# Patient Record
Sex: MALE | Race: BLACK OR AFRICAN AMERICAN | NOT HISPANIC OR LATINO | Employment: UNEMPLOYED | URBAN - METROPOLITAN AREA
[De-identification: names, ages, dates, MRNs, and addresses within clinical notes are randomized per-mention and may not be internally consistent; named-entity substitution may affect disease eponyms.]

---

## 2019-07-09 NOTE — TELEPHONE ENCOUNTER
Pharmacy needs changed to [facility] [address], [phone]. Patient would like to have this prescription called in or sent electronically if possible rather than picking up a paper copy, please advise.

## 2019-07-09 NOTE — TELEPHONE ENCOUNTER
Pt is requesting a refill on his norco 325/75 3x daily. He states he gets 90 tablets for 30 days.  He is wanting it sent to the pharmacy or called in.

## 2019-07-10 ENCOUNTER — TELEPHONE (OUTPATIENT)
Dept: FAMILY MEDICINE CLINIC | Facility: CLINIC | Age: 54
End: 2019-07-10

## 2019-07-11 ENCOUNTER — TELEPHONE (OUTPATIENT)
Dept: FAMILY MEDICINE CLINIC | Facility: CLINIC | Age: 54
End: 2019-07-11

## 2019-07-11 RX ORDER — HYDROCODONE BITARTRATE AND ACETAMINOPHEN 7.5; 325 MG/1; MG/1
1 TABLET ORAL EVERY 6 HOURS PRN
Qty: 90 TABLET | Refills: 0 | Status: SHIPPED | OUTPATIENT
Start: 2019-07-11 | End: 2019-07-14

## 2019-07-11 NOTE — TELEPHONE ENCOUNTER
Please see telephone encounter from 7/09/2019. Patient has called several times asking about his medication refill.

## 2019-07-11 NOTE — TELEPHONE ENCOUNTER
Patient has called several times and is he very upset. Please call patient and update him on the status of his prescription. See other telephone encounters.

## 2019-07-25 RX ORDER — DICLOFENAC POTASSIUM 50 MG/1
50 TABLET, FILM COATED ORAL 3 TIMES DAILY
Qty: 90 TABLET | Refills: 0 | Status: SHIPPED | OUTPATIENT
Start: 2019-07-25 | End: 2020-01-20

## 2019-08-09 RX ORDER — HYDROCODONE BITARTRATE AND ACETAMINOPHEN 7.5; 325 MG/1; MG/1
1 TABLET ORAL EVERY 6 HOURS PRN
Qty: 90 TABLET | Refills: 0 | Status: SHIPPED | OUTPATIENT
Start: 2019-08-09 | End: 2019-08-12

## 2019-08-09 RX ORDER — HYDROCODONE BITARTRATE AND ACETAMINOPHEN 7.5; 325 MG/1; MG/1
1 TABLET ORAL EVERY 6 HOURS PRN
Qty: 90 TABLET | Refills: 0 | Status: CANCELLED | OUTPATIENT
Start: 2019-08-09

## 2019-08-09 RX ORDER — HYDROCODONE BITARTRATE AND ACETAMINOPHEN 7.5; 325 MG/1; MG/1
1 TABLET ORAL EVERY 6 HOURS PRN
Qty: 90 TABLET | Refills: 0 | Status: SHIPPED | OUTPATIENT
Start: 2019-08-09 | End: 2019-09-10 | Stop reason: SDUPTHER

## 2019-08-09 NOTE — TELEPHONE ENCOUNTER
Pt is requesting a refill on the following medication, Narco 325/7.5mg.  [facility]   Next appt 8/12

## 2019-08-12 ENCOUNTER — OFFICE VISIT (OUTPATIENT)
Dept: FAMILY MEDICINE CLINIC | Facility: CLINIC | Age: 54
End: 2019-08-12

## 2019-08-12 VITALS
TEMPERATURE: 97.9 F | DIASTOLIC BLOOD PRESSURE: 80 MMHG | SYSTOLIC BLOOD PRESSURE: 120 MMHG | BODY MASS INDEX: 35.51 KG/M2 | OXYGEN SATURATION: 96 % | HEART RATE: 54 BPM | WEIGHT: 226.25 LBS | HEIGHT: 67 IN

## 2019-08-12 DIAGNOSIS — I25.118 CORONARY ARTERY DISEASE OF NATIVE HEART WITH STABLE ANGINA PECTORIS, UNSPECIFIED VESSEL OR LESION TYPE (HCC): ICD-10-CM

## 2019-08-12 DIAGNOSIS — E78.5 HYPERLIPIDEMIA, UNSPECIFIED HYPERLIPIDEMIA TYPE: ICD-10-CM

## 2019-08-12 DIAGNOSIS — R52 ENCOUNTER FOR PAIN MANAGEMENT: ICD-10-CM

## 2019-08-12 DIAGNOSIS — M25.552 HIP PAIN, BILATERAL: ICD-10-CM

## 2019-08-12 DIAGNOSIS — Z79.899 HIGH RISK MEDICATIONS (NOT ANTICOAGULANTS) LONG-TERM USE: ICD-10-CM

## 2019-08-12 DIAGNOSIS — M25.551 HIP PAIN, BILATERAL: ICD-10-CM

## 2019-08-12 DIAGNOSIS — I10 ESSENTIAL HYPERTENSION: Primary | ICD-10-CM

## 2019-08-12 PROCEDURE — 99214 OFFICE O/P EST MOD 30 MIN: CPT | Performed by: FAMILY MEDICINE

## 2019-08-12 RX ORDER — POTASSIUM CHLORIDE 20 MEQ/1
20 TABLET, EXTENDED RELEASE ORAL DAILY
Qty: 30 TABLET | Refills: 6 | Status: SHIPPED | OUTPATIENT
Start: 2019-08-12 | End: 2020-06-15 | Stop reason: SDUPTHER

## 2019-08-12 NOTE — PROGRESS NOTES
Subjective   Navneet Roach is a 53 y.o. male.     Chief Complaint   Patient presents with   • Med Management   • Labs Only   • Hypertension   • Hyperlipidemia   • Hip Pain       Hypertension   This is a chronic problem. The current episode started more than 1 year ago. The problem is unchanged. The problem is controlled. Pertinent negatives include no blurred vision, chest pain, palpitations or shortness of breath. Past treatments include angiotensin blockers and diuretics. Current antihypertension treatment includes angiotensin blockers and diuretics. There is no history of angina, kidney disease or CAD/MI. There is no history of chronic renal disease, coarctation of the aorta, hyperaldosteronism, hypercortisolism or hyperparathyroidism.   Hyperlipidemia   This is a chronic problem. The current episode started more than 1 year ago. The problem is controlled. He has no history of chronic renal disease. Pertinent negatives include no chest pain or shortness of breath.   Hip Pain    Incident onset: chronic , more than 1 year ago, sp B hip replacement. The pain is present in the left hip and right thigh. The pain has been constant since onset. Pertinent negatives include no numbness. He has tried NSAIDs (s/p surgery, now on Norco) for the symptoms.          The following portions of the patient's history were reviewed and updated as appropriate: allergies, current medications, past family history, past medical history, past social history, past surgical history and problem list.    Past Medical History:   Diagnosis Date   • Abnormal ECG    • Acute bronchitis    • Arthritis    • Blood in stool    • Constipation    • Coronary artery disease    • Cough    • DDD (degenerative disc disease), cervical    • Dyspnea    • Edema    • Fatigue    • Hemorrhoids    • Hip pain    • History of bronchitis     INHALER PRN   • History of echocardiogram    • History of kidney stones 06/2016   • History of long-term treatment with  high-risk medication    • Hyperlipidemia    • Hypertension    • Insomnia    • Leg swelling     TAKES DIURETICS PRN   • Limb pain    • Limited joint range of motion (ROM)     NECK   • Low back pain    • Morbid obesity (CMS/HCC)    • Muscle spasm    • Neck pain    • Obesity    • Osteoarthritis    • Osteoarthritis of hips, bilateral    • Osteoarthritis of right hip    • Pain in right hip    • Peripheral neuropathy    • Rectal abscess    • Rectal pain    • Screening     STOP BANG   • Valvular heart disease        Past Surgical History:   Procedure Laterality Date   • CARDIAC CATHETERIZATION      DR KIM   • CERVICAL DISCECTOMY ANTERIOR      POSTERIOR   • TOTAL HIP ARTHROPLASTY Right 8/19/2016    Procedure: RT TOTAL HIP ARTHROPLASTY ANTERIOR WITH HANA TABLE;  Surgeon: Joe Gore MD;  Location: [facility] OR;  Service:    • TOTAL HIP ARTHROPLASTY Left 11/4/2016    Procedure: LT TOTAL HIP ARTHROPLASTY;  Surgeon: Joe Gore MD;  Location: [facility] OR;  Service:        Family History   Problem Relation Age of Onset   • Hypertension Mother    • Hypertension Father    • Hyperlipidemia Father    • Hypertension Sister    • Hypertension Brother        Social History     Socioeconomic History   • Marital status:      Spouse name: Not on file   • Number of children: Not on file   • Years of education: Not on file   • Highest education level: Not on file   Tobacco Use   • Smoking status: Current Every Day Smoker     Packs/day: 0.25     Years: 28.00     Pack years: 7.00     Types: Cigarettes   Substance and Sexual Activity   • Alcohol use: No     Comment: OCCASIONAL WEEKLY OR MONTHLY   • Drug use: Yes   • Sexual activity: Defer       Review of Systems   Constitutional: Negative for fatigue and unexpected weight gain.   Eyes: Negative for blurred vision.   Respiratory: Negative for cough, chest tightness and shortness of breath.    Cardiovascular: Negative for chest pain, palpitations and leg swelling.    Gastrointestinal: Negative for nausea and vomiting.   Endocrine: Negative for polydipsia and polyuria.   Genitourinary: Negative for frequency.   Musculoskeletal: Positive for arthralgias and back pain.   Skin: Negative for skin lesions.   Neurological: Negative for dizziness, light-headedness, numbness and headache.       Objective   Vitals:    08/12/19 1429   BP: 120/80   Pulse: 54   Temp: 97.9 °F (36.6 °C)   SpO2: 96%     Body mass index is 35.44 kg/m².  Physical Exam   Constitutional: He appears well-developed and well-nourished. No distress.   Cardiovascular: Normal rate and regular rhythm. Exam reveals no friction rub.   No murmur heard.  Pulmonary/Chest: Effort normal and breath sounds normal. No stridor. No respiratory distress.   Musculoskeletal:   B hip RROM 75% due to pain   Skin: He is not diaphoretic.   Nursing note and vitals reviewed.        Assessment/Plan   Navneet was seen today for med management, labs only, hypertension, hyperlipidemia and hip pain.    Diagnoses and all orders for this visit:    Essential hypertension  -     Comprehensive Metabolic Panel  -     Lipid Panel  -     CBC & Differential  -     potassium chloride (K-DUR,KLOR-CON) 20 MEQ CR tablet; Take 1 tablet by mouth Daily. Pt not taking bid as rx'ed. Takes one daily if needed for body cramps    Coronary artery disease of native heart with stable angina pectoris, unspecified vessel or lesion type (CMS/HCC)  -     Comprehensive Metabolic Panel  -     Lipid Panel    Hyperlipidemia, unspecified hyperlipidemia type  -     Comprehensive Metabolic Panel  -     Lipid Panel    Encounter for pain management  -     Compliance Drug Analysis, Ur - Urine, Clean Catch    Hip pain, bilateral  -     Compliance Drug Analysis, Ur - Urine, Clean Catch    High risk medications (not anticoagulants) long-term use  -     Compliance Drug Analysis, Ur - Urine, Clean Catch      Demetrio reviwed and complaint with therapy, however pt was counseled that he  will need to taken off chronic Norco use, consult with ortho strongly recommended.

## 2019-09-10 RX ORDER — HYDROCODONE BITARTRATE AND ACETAMINOPHEN 7.5; 325 MG/1; MG/1
1 TABLET ORAL EVERY 6 HOURS PRN
Qty: 90 TABLET | Refills: 0 | Status: SHIPPED | OUTPATIENT
Start: 2019-09-10 | End: 2019-10-07 | Stop reason: SDUPTHER

## 2019-10-07 ENCOUNTER — TELEPHONE (OUTPATIENT)
Dept: FAMILY MEDICINE CLINIC | Facility: CLINIC | Age: 54
End: 2019-10-07

## 2019-10-07 DIAGNOSIS — M16.11 OSTEOARTHRITIS OF RIGHT HIP, UNSPECIFIED OSTEOARTHRITIS TYPE: Primary | ICD-10-CM

## 2019-10-07 RX ORDER — HYDROCODONE BITARTRATE AND ACETAMINOPHEN 7.5; 325 MG/1; MG/1
1 TABLET ORAL EVERY 6 HOURS PRN
Qty: 90 TABLET | Refills: 0 | Status: SHIPPED | OUTPATIENT
Start: 2019-10-07 | End: 2019-11-13 | Stop reason: SDUPTHER

## 2019-10-11 ENCOUNTER — TELEPHONE (OUTPATIENT)
Dept: FAMILY MEDICINE CLINIC | Facility: CLINIC | Age: 54
End: 2019-10-11

## 2019-10-21 ENCOUNTER — OFFICE VISIT (OUTPATIENT)
Dept: FAMILY MEDICINE CLINIC | Facility: CLINIC | Age: 54
End: 2019-10-21

## 2019-10-21 VITALS
OXYGEN SATURATION: 95 % | BODY MASS INDEX: 34.88 KG/M2 | DIASTOLIC BLOOD PRESSURE: 88 MMHG | HEIGHT: 67 IN | SYSTOLIC BLOOD PRESSURE: 134 MMHG | WEIGHT: 222.25 LBS | TEMPERATURE: 98.7 F | HEART RATE: 65 BPM

## 2019-10-21 DIAGNOSIS — Z98.890 STATUS POST LUMBAR SPINE OPERATIVE PROCEDURE FOR DECOMPRESSION OF SPINAL CORD: ICD-10-CM

## 2019-10-21 DIAGNOSIS — M54.2 CHRONIC NECK PAIN: ICD-10-CM

## 2019-10-21 DIAGNOSIS — J45.20 MILD INTERMITTENT ASTHMA WITHOUT COMPLICATION: ICD-10-CM

## 2019-10-21 DIAGNOSIS — I10 ESSENTIAL HYPERTENSION: ICD-10-CM

## 2019-10-21 DIAGNOSIS — Z00.00 MEDICARE ANNUAL WELLNESS VISIT, SUBSEQUENT: Primary | ICD-10-CM

## 2019-10-21 DIAGNOSIS — E66.01 MORBIDLY OBESE (HCC): ICD-10-CM

## 2019-10-21 DIAGNOSIS — G89.29 CHRONIC NECK PAIN: ICD-10-CM

## 2019-10-21 PROCEDURE — 96160 PT-FOCUSED HLTH RISK ASSMT: CPT | Performed by: FAMILY MEDICINE

## 2019-10-21 PROCEDURE — G0439 PPPS, SUBSEQ VISIT: HCPCS | Performed by: FAMILY MEDICINE

## 2019-10-21 RX ORDER — ALBUTEROL SULFATE 90 UG/1
2 AEROSOL, METERED RESPIRATORY (INHALATION)
Qty: 1 INHALER | Refills: 6 | Status: SHIPPED | OUTPATIENT
Start: 2019-10-21 | End: 2019-12-19 | Stop reason: SDUPTHER

## 2019-10-21 NOTE — PROGRESS NOTES
The ABCs of the Annual Wellness Visit  Subsequent Medicare Wellness Visit    Chief Complaint   Patient presents with   • Hypertension   • Medicare Wellness-subsequent       Subjective   History of Present Illness:  Navneet Roach is a 53 y.o. male who presents for a Subsequent Medicare Wellness Visit.    HEALTH RISK ASSESSMENT    Recent Hospitalizations:  No hospitalization(s) within the last year.    Current Medical Providers:  Patient Care Team:  Payal Bernardo MD as PCP - General (Family Medicine)  Amarilis Beasley MD as Consulting Physician (Cardiology)    Smoking Status:  Social History     Tobacco Use   Smoking Status Current Every Day Smoker   • Packs/day: 0.25   • Years: 28.00   • Pack years: 7.00   • Types: Cigarettes   Smokeless Tobacco Never Used       Alcohol Consumption:  Social History     Substance and Sexual Activity   Alcohol Use No    Comment: OCCASIONAL WEEKLY OR MONTHLY       Depression Screen:   PHQ-2/PHQ-9 Depression Screening 10/21/2019   Little interest or pleasure in doing things 0   Feeling down, depressed, or hopeless 0   Trouble falling or staying asleep, or sleeping too much 0   Feeling tired or having little energy 0   Poor appetite or overeating 0   Feeling bad about yourself - or that you are a failure or have let yourself or your family down 0   Trouble concentrating on things, such as reading the newspaper or watching television 0   Moving or speaking so slowly that other people could have noticed. Or the opposite - being so fidgety or restless that you have been moving around a lot more than usual 0   Thoughts that you would be better off dead, or of hurting yourself in some way 0   Total Score 0   If you checked off any problems, how difficult have these problems made it for you to do your work, take care of things at home, or get along with other people? Not difficult at all       Fall Risk Screen:  EMILIANO Fall Risk Assessment has not been completed.    Health  Habits and Functional and Cognitive Screening:  Functional & Cognitive Status 10/21/2019   Do you have difficulty preparing food and eating? No   Do you have difficulty bathing yourself, getting dressed or grooming yourself? No   Do you have difficulty using the toilet? No   Do you have difficulty moving around from place to place? No   Do you have trouble with steps or getting out of a bed or a chair? No   Current Diet Well Balanced Diet   Dental Exam Not up to date   Eye Exam Up to date   Exercise (times per week) 3 times per week   Current Exercise Activities Include Walking   Do you need help using the phone?  No   Are you deaf or do you have serious difficulty hearing?  No   Do you need help with transportation? No   Do you need help shopping? No   Do you need help preparing meals?  No   Do you need help with housework?  No   Do you need help with laundry? No   Do you need help taking your medications? No   Do you need help managing money? No   Do you ever drive or ride in a car without wearing a seat belt? No   Have you felt unusual stress, anger or loneliness in the last month? No   Who do you live with? Alone   If you need help, do you have trouble finding someone available to you? Yes   Have you been bothered in the last four weeks by sexual problems? No   Do you have difficulty concentrating, remembering or making decisions? No         Does the patient have evidence of cognitive impairment? No    Asprin use counseling:Taking ASA appropriately as indicated    Age-appropriate Screening Schedule:  Refer to the list below for future screening recommendations based on patient's age, sex and/or medical conditions. Orders for these recommended tests are listed in the plan section. The patient has been provided with a written plan.    Health Maintenance   Topic Date Due   • PNEUMOCOCCAL VACCINE (19-64 MEDIUM RISK) (1 of 1 - PPSV23) 12/29/1986   • TDAP/TD VACCINES (1 - Tdap) 12/29/1986   • ZOSTER VACCINE (1 of 2)  12/29/2017   • LIPID PANEL  07/09/2019   • COLONOSCOPY  07/09/2019   • INFLUENZA VACCINE  Discontinued          The following portions of the patient's history were reviewed and updated as appropriate: allergies, current medications, past family history, past medical history, past social history, past surgical history and problem list.    Outpatient Medications Prior to Visit   Medication Sig Dispense Refill   • Amlodipine-Valsartan-HCTZ (EXFORGE HCT) -25 MG tablet Take 1 tablet by mouth Daily. Confirm that home med is with HCT. Pt states his home med has 3 meds/mg.     • aspirin 325 MG tablet Take 325 mg by mouth Daily. Pt stopped preop, hasnt had since 9/28/16     • cyclobenzaprine (FLEXERIL) 10 MG tablet Take 1 tablet by mouth 3 (Three) Times a Day As Needed for muscle spasms. 56 tablet 0   • diclofenac (CATAFLAM) 50 MG tablet Take 1 tablet by mouth 3 (Three) Times a Day. 90 tablet 0   • HYDROcodone-acetaminophen (NORCO) 7.5-325 MG per tablet Take 1 tablet by mouth Every 6 (Six) Hours As Needed for Moderate Pain . 90 tablet 0   • nebivolol (BYSTOLIC) 10 MG tablet Take 10 mg by mouth Daily.     • potassium chloride (K-DUR,KLOR-CON) 20 MEQ CR tablet Take 1 tablet by mouth Daily. Pt not taking bid as rx'ed. Takes one daily if needed for body cramps 30 tablet 6   • rosuvastatin (CRESTOR) 10 MG tablet Take 10 mg by mouth daily.     • torsemide (DEMADEX) 20 MG tablet Take 20 mg by mouth 2 (Two) Times a Day As Needed. Prn lower leg swelling; confirm dosage; pt not taking regularly     • albuterol (PROVENTIL HFA) 108 (90 BASE) MCG/ACT inhaler Inhale 2 puffs Every 8 (Eight) Hours As Needed.       No facility-administered medications prior to visit.        Patient Active Problem List   Diagnosis   • Osteoarthritis of right hip   • Primary osteoarthritis of left hip   • Anemia, posthemorrhagic, acute   • DAVID (acute kidney injury) (CMS/HCC)   • CKD (chronic kidney disease)   • HTN (hypertension)   • CAD (coronary  "artery disease)   • Hyperlipidemia   • Medicare annual wellness visit, subsequent   • Hip pain, bilateral   • Chronic neck pain   • Status post lumbar spine operative procedure for decompression of spinal cord   • Morbidly obese (CMS/[facility])       Advanced Care Planning:  Patient does not have an advance directive - information provided to the patient today    Review of Systems   Constitutional: Negative.    Respiratory: Negative.    Cardiovascular: Negative.        Compared to one year ago, the patient feels his physical health is the same.  Compared to one year ago, the patient feels his mental health is the same.    Reviewed chart for potential of high risk medication in the elderly: yes  Reviewed chart for potential of harmful drug interactions in the elderly:yes    Objective         Vitals:    10/21/19 1340   BP: 134/88   Pulse: 65   Temp: 98.7 °F (37.1 °C)   SpO2: 95%   Weight: 101 kg (222 lb 4 oz)   Height: 170.2 cm (67.01\")       Body mass index is 34.8 kg/m².  Discussed the patient's BMI with him. The BMI is above average; BMI management plan is completed.    Physical Exam   Constitutional: He appears well-developed and well-nourished. No distress.   Cardiovascular: Normal rate and regular rhythm.   No murmur heard.  Pulmonary/Chest: Effort normal and breath sounds normal. No respiratory distress.   Skin: He is not diaphoretic.   Nursing note and vitals reviewed.            Assessment/Plan   Medicare Risks and Personalized Health Plan  CMS Preventative Services Quick Reference  Fall Risk    The above risks/problems have been discussed with the patient.  Pertinent information has been shared with the patient in the After Visit Summary.  Follow up plans and orders are seen below in the Assessment/Plan Section.    Diagnoses and all orders for this visit:    1. Medicare annual wellness visit, subsequent (Primary)    2. Essential hypertension    3. Chronic neck pain    4. Status post lumbar spine operative " procedure for decompression of spinal cord    5. Morbidly obese (CMS/HCC)    6. Mild intermittent asthma without complication  -     albuterol sulfate HFA (PROVENTIL HFA) 108 (90 Base) MCG/ACT inhaler; Inhale 2 puffs 4 (Four) Times a Day.  Dispense: 1 inhaler; Refill: 6      Follow Up:  Return in about 2 weeks (around 11/4/2019) for pain management.     An After Visit Summary and PPPS were given to the patient.

## 2019-10-22 LAB
ALBUMIN SERPL-MCNC: 4.5 G/DL (ref 3.5–5.5)
ALBUMIN/GLOB SERPL: 2.1 {RATIO} (ref 1.2–2.2)
ALP SERPL-CCNC: 63 IU/L (ref 39–117)
ALT SERPL-CCNC: 15 IU/L (ref 0–44)
AST SERPL-CCNC: 15 IU/L (ref 0–40)
BASOPHILS # BLD AUTO: 0 X10E3/UL (ref 0–0.2)
BASOPHILS NFR BLD AUTO: 0 %
BILIRUB SERPL-MCNC: 0.3 MG/DL (ref 0–1.2)
BUN SERPL-MCNC: 15 MG/DL (ref 6–24)
BUN/CREAT SERPL: 15 (ref 9–20)
CALCIUM SERPL-MCNC: 9.3 MG/DL (ref 8.7–10.2)
CHLORIDE SERPL-SCNC: 108 MMOL/L (ref 96–106)
CHOLEST SERPL-MCNC: 147 MG/DL (ref 100–199)
CO2 SERPL-SCNC: 19 MMOL/L (ref 20–29)
CREAT SERPL-MCNC: 1.01 MG/DL (ref 0.76–1.27)
EOSINOPHIL # BLD AUTO: 0.1 X10E3/UL (ref 0–0.4)
EOSINOPHIL NFR BLD AUTO: 3 %
ERYTHROCYTE [DISTWIDTH] IN BLOOD BY AUTOMATED COUNT: 14 % (ref 12.3–15.4)
GLOBULIN SER CALC-MCNC: 2.1 G/DL (ref 1.5–4.5)
GLUCOSE SERPL-MCNC: 95 MG/DL (ref 65–99)
HCT VFR BLD AUTO: 46.9 % (ref 37.5–51)
HDLC SERPL-MCNC: 38 MG/DL
HGB BLD-MCNC: 15.9 G/DL (ref 13–17.7)
IMM GRANULOCYTES # BLD AUTO: 0 X10E3/UL (ref 0–0.1)
IMM GRANULOCYTES NFR BLD AUTO: 0 %
LDLC SERPL CALC-MCNC: 98 MG/DL (ref 0–99)
LYMPHOCYTES # BLD AUTO: 2.2 X10E3/UL (ref 0.7–3.1)
LYMPHOCYTES NFR BLD AUTO: 44 %
MCH RBC QN AUTO: 31.6 PG (ref 26.6–33)
MCHC RBC AUTO-ENTMCNC: 33.9 G/DL (ref 31.5–35.7)
MCV RBC AUTO: 93 FL (ref 79–97)
MONOCYTES # BLD AUTO: 0.3 X10E3/UL (ref 0.1–0.9)
MONOCYTES NFR BLD AUTO: 6 %
NEUTROPHILS # BLD AUTO: 2.3 X10E3/UL (ref 1.4–7)
NEUTROPHILS NFR BLD AUTO: 47 %
PLATELET # BLD AUTO: 210 X10E3/UL (ref 150–450)
POTASSIUM SERPL-SCNC: 4 MMOL/L (ref 3.5–5.2)
PROT SERPL-MCNC: 6.6 G/DL (ref 6–8.5)
RBC # BLD AUTO: 5.03 X10E6/UL (ref 4.14–5.8)
SODIUM SERPL-SCNC: 144 MMOL/L (ref 134–144)
TRIGL SERPL-MCNC: 53 MG/DL (ref 0–149)
VLDLC SERPL CALC-MCNC: 11 MG/DL (ref 5–40)
WBC # BLD AUTO: 4.9 X10E3/UL (ref 3.4–10.8)

## 2019-10-25 LAB — DRUGS UR: NORMAL

## 2019-11-07 DIAGNOSIS — M16.11 OSTEOARTHRITIS OF RIGHT HIP, UNSPECIFIED OSTEOARTHRITIS TYPE: ICD-10-CM

## 2019-11-07 NOTE — TELEPHONE ENCOUNTER
Patient needs a refill on his hydrocodone 7.5-325 please.  Pharmacy is [facility] on file.  His phone number is [phone].

## 2019-11-13 RX ORDER — HYDROCODONE BITARTRATE AND ACETAMINOPHEN 7.5; 325 MG/1; MG/1
1 TABLET ORAL EVERY 6 HOURS PRN
Qty: 90 TABLET | Refills: 0 | Status: SHIPPED | OUTPATIENT
Start: 2019-11-13 | End: 2019-12-12 | Stop reason: SDUPTHER

## 2019-11-13 NOTE — TELEPHONE ENCOUNTER
Can you please sign off on this for Dr. Bernardo? I think she forgot to do it yesterday before she left and the patient is out of medication.

## 2019-11-13 NOTE — TELEPHONE ENCOUNTER
CAN YOU PLEASE HAVE ANOTHER DR. SEND THIS IN? PT HAS BEEN WAITING 6 DAYS FOR REFILL? SEVERAL MESSAGES HAVE BEEN TAKEN.

## 2019-12-11 DIAGNOSIS — M16.11 OSTEOARTHRITIS OF RIGHT HIP, UNSPECIFIED OSTEOARTHRITIS TYPE: ICD-10-CM

## 2019-12-12 RX ORDER — HYDROCODONE BITARTRATE AND ACETAMINOPHEN 7.5; 325 MG/1; MG/1
1 TABLET ORAL EVERY 6 HOURS PRN
Qty: 90 TABLET | Refills: 0 | Status: SHIPPED | OUTPATIENT
Start: 2019-12-12 | End: 2020-01-13 | Stop reason: SDUPTHER

## 2019-12-19 DIAGNOSIS — J45.20 MILD INTERMITTENT ASTHMA WITHOUT COMPLICATION: ICD-10-CM

## 2019-12-19 RX ORDER — ALBUTEROL SULFATE 90 UG/1
2 AEROSOL, METERED RESPIRATORY (INHALATION)
Qty: 1 INHALER | Refills: 6 | Status: SHIPPED | OUTPATIENT
Start: 2019-12-19 | End: 2021-04-09 | Stop reason: SDUPTHER

## 2020-01-13 DIAGNOSIS — M16.11 OSTEOARTHRITIS OF RIGHT HIP, UNSPECIFIED OSTEOARTHRITIS TYPE: ICD-10-CM

## 2020-01-13 RX ORDER — HYDROCODONE BITARTRATE AND ACETAMINOPHEN 7.5; 325 MG/1; MG/1
1 TABLET ORAL EVERY 6 HOURS PRN
Qty: 90 TABLET | Refills: 0 | Status: SHIPPED | OUTPATIENT
Start: 2020-01-13 | End: 2020-03-19 | Stop reason: SDUPTHER

## 2020-01-13 NOTE — TELEPHONE ENCOUNTER
Patient needs a refill on his hydrocodone 7.5-325 please sent to the [facility] on file.  His number is [phone].

## 2020-01-20 RX ORDER — AMLODIPINE, VALSARTAN AND HYDROCHLOROTHIAZIDE 10; 320; 25 MG/1; MG/1; MG/1
1 TABLET ORAL DAILY
Qty: 30 TABLET | Refills: 3 | Status: SHIPPED | OUTPATIENT
Start: 2020-01-20 | End: 2020-01-27 | Stop reason: DRUGHIGH

## 2020-01-20 RX ORDER — DICLOFENAC POTASSIUM 50 MG/1
TABLET, FILM COATED ORAL
Qty: 90 TABLET | Refills: 0 | Status: SHIPPED | OUTPATIENT
Start: 2020-01-20 | End: 2020-02-13 | Stop reason: SDUPTHER

## 2020-01-27 RX ORDER — AMLODIPINE, VALSARTAN AND HYDROCHLOROTHIAZIDE 10; 320; 25 MG/1; MG/1; MG/1
1 TABLET ORAL DAILY
Qty: 30 TABLET | Refills: 3 | Status: CANCELLED | OUTPATIENT
Start: 2020-01-27

## 2020-01-27 RX ORDER — METOPROLOL SUCCINATE 50 MG/1
50 TABLET, EXTENDED RELEASE ORAL DAILY
Qty: 90 TABLET | Refills: 1 | Status: SHIPPED | OUTPATIENT
Start: 2020-01-27 | End: 2021-04-09 | Stop reason: SDUPTHER

## 2020-01-27 RX ORDER — ROSUVASTATIN CALCIUM 10 MG/1
10 TABLET, COATED ORAL DAILY
Qty: 90 TABLET | Refills: 3 | Status: SHIPPED | OUTPATIENT
Start: 2020-01-27 | End: 2020-02-14 | Stop reason: SDUPTHER

## 2020-01-27 RX ORDER — HYDROCHLOROTHIAZIDE 25 MG/1
25 TABLET ORAL DAILY
Qty: 90 TABLET | Refills: 1 | Status: SHIPPED | OUTPATIENT
Start: 2020-01-27 | End: 2021-03-12 | Stop reason: SDUPTHER

## 2020-01-27 RX ORDER — VALSARTAN 320 MG/1
320 TABLET ORAL DAILY
Qty: 90 TABLET | Refills: 3 | Status: SHIPPED | OUTPATIENT
Start: 2020-01-27 | End: 2020-04-15 | Stop reason: SDUPTHER

## 2020-01-27 RX ORDER — AMLODIPINE BESYLATE 10 MG/1
10 TABLET ORAL DAILY
Qty: 90 TABLET | Refills: 1 | Status: SHIPPED | OUTPATIENT
Start: 2020-01-27 | End: 2021-03-12 | Stop reason: SDUPTHER

## 2020-02-06 ENCOUNTER — TELEPHONE (OUTPATIENT)
Dept: FAMILY MEDICINE CLINIC | Facility: CLINIC | Age: 55
End: 2020-02-06

## 2020-02-13 RX ORDER — DICLOFENAC POTASSIUM 50 MG/1
TABLET, FILM COATED ORAL
Qty: 90 TABLET | Refills: 1 | Status: SHIPPED | OUTPATIENT
Start: 2020-02-13 | End: 2021-01-08 | Stop reason: SDUPTHER

## 2020-02-14 RX ORDER — ROSUVASTATIN CALCIUM 10 MG/1
10 TABLET, COATED ORAL DAILY
Qty: 90 TABLET | Refills: 3 | Status: SHIPPED | OUTPATIENT
Start: 2020-02-14 | End: 2020-02-20 | Stop reason: DRUGHIGH

## 2020-02-20 RX ORDER — ROSUVASTATIN CALCIUM 40 MG/1
40 TABLET, COATED ORAL DAILY
Qty: 30 TABLET | Refills: 6 | Status: SHIPPED | OUTPATIENT
Start: 2020-02-20 | End: 2021-04-09 | Stop reason: SDUPTHER

## 2020-03-11 ENCOUNTER — TELEPHONE (OUTPATIENT)
Dept: FAMILY MEDICINE CLINIC | Facility: CLINIC | Age: 55
End: 2020-03-11

## 2020-03-11 NOTE — TELEPHONE ENCOUNTER
Patient called and state he has had to cancel appointments due to lack of transportation.  He says his insurance told him that if we call them and give authorization for transportation and meal delivery, he could get that and be able to come to th doctor.  His insurance is [employer].  His phone number is [phone].

## 2020-03-11 NOTE — TELEPHONE ENCOUNTER
His member Id number is [id number] and the provider number [facility] that's listed on the back of his card is [phone].

## 2020-03-18 NOTE — TELEPHONE ENCOUNTER
Checking on paperwork for transportation and meal delivery. Also wants to know if an exception can be made for his Hydrocodone? Says he is out and hasnt had it in 2 months

## 2020-03-19 ENCOUNTER — OFFICE VISIT (OUTPATIENT)
Dept: FAMILY MEDICINE CLINIC | Facility: CLINIC | Age: 55
End: 2020-03-19

## 2020-03-19 ENCOUNTER — TELEPHONE (OUTPATIENT)
Dept: FAMILY MEDICINE CLINIC | Facility: CLINIC | Age: 55
End: 2020-03-19

## 2020-03-19 ENCOUNTER — RESULTS ENCOUNTER (OUTPATIENT)
Dept: FAMILY MEDICINE CLINIC | Facility: CLINIC | Age: 55
End: 2020-03-19

## 2020-03-19 VITALS
DIASTOLIC BLOOD PRESSURE: 84 MMHG | OXYGEN SATURATION: 95 % | SYSTOLIC BLOOD PRESSURE: 132 MMHG | WEIGHT: 231.25 LBS | BODY MASS INDEX: 36.29 KG/M2 | TEMPERATURE: 98.1 F | HEIGHT: 67 IN | HEART RATE: 68 BPM

## 2020-03-19 DIAGNOSIS — Z00.00 HEALTHCARE MAINTENANCE: ICD-10-CM

## 2020-03-19 DIAGNOSIS — Z12.11 COLON CANCER SCREENING: ICD-10-CM

## 2020-03-19 DIAGNOSIS — M16.11 OSTEOARTHRITIS OF RIGHT HIP, UNSPECIFIED OSTEOARTHRITIS TYPE: ICD-10-CM

## 2020-03-19 DIAGNOSIS — I10 ESSENTIAL HYPERTENSION: Primary | ICD-10-CM

## 2020-03-19 DIAGNOSIS — E78.5 HYPERLIPIDEMIA, UNSPECIFIED HYPERLIPIDEMIA TYPE: ICD-10-CM

## 2020-03-19 PROCEDURE — 99214 OFFICE O/P EST MOD 30 MIN: CPT | Performed by: FAMILY MEDICINE

## 2020-03-19 RX ORDER — HYDROCODONE BITARTRATE AND ACETAMINOPHEN 7.5; 325 MG/1; MG/1
1 TABLET ORAL EVERY 6 HOURS PRN
Qty: 90 TABLET | Refills: 0 | Status: CANCELLED | OUTPATIENT
Start: 2020-03-19

## 2020-03-19 RX ORDER — HYDROCODONE BITARTRATE AND ACETAMINOPHEN 7.5; 325 MG/1; MG/1
1 TABLET ORAL EVERY 6 HOURS PRN
Qty: 90 TABLET | Refills: 0 | Status: SHIPPED | OUTPATIENT
Start: 2020-03-19 | End: 2020-04-16 | Stop reason: SDUPTHER

## 2020-03-19 NOTE — TELEPHONE ENCOUNTER
I have called and spoke with them and they were suppose to fax forms. I have not receive the forms. Pt is to contact them and give them our fax number and have them fax the forms to our office.

## 2020-03-19 NOTE — PROGRESS NOTES
Subjective   Navneet Roach is a 54 y.o. male.     Chief Complaint   Patient presents with   • Med Refill     hydrocdone   • Hypertension   • Hyperlipidemia       Hypertension   This is a chronic problem. The current episode started more than 1 year ago. The problem is unchanged. The problem is controlled.   Hyperlipidemia   This is a chronic problem. The current episode started more than 1 year ago. The problem is controlled. Recent lipid tests were reviewed and are normal.   Osteoarthritis   Presents for follow-up (pain is controlled by pain medications) visit. His past medical history is significant for osteoarthritis.          The following portions of the patient's history were reviewed and updated as appropriate: allergies, current medications, past family history, past medical history, past social history, past surgical history and problem list.    Past Medical History:   Diagnosis Date   • Abnormal ECG    • Acute bronchitis    • Arthritis    • Blood in stool    • Constipation    • Coronary artery disease    • Cough    • DDD (degenerative disc disease), cervical    • Dyspnea    • Edema    • Fatigue    • Hemorrhoids    • Hip pain    • History of bronchitis     INHALER PRN   • History of echocardiogram    • History of kidney stones 06/2016   • History of long-term treatment with high-risk medication    • Hyperlipidemia    • Hypertension    • Insomnia    • Leg swelling     TAKES DIURETICS PRN   • Limb pain    • Limited joint range of motion (ROM)     NECK   • Low back pain    • Morbid obesity (CMS/HCC)    • Muscle spasm    • Neck pain    • Obesity    • Osteoarthritis    • Osteoarthritis of hips, bilateral    • Osteoarthritis of right hip    • Pain in right hip    • Peripheral neuropathy    • Rectal abscess    • Rectal pain    • Screening     STOP BANG   • Valvular heart disease        Past Surgical History:   Procedure Laterality Date   • CARDIAC CATHETERIZATION      DR KIM   • CERVICAL DISCECTOMY  ANTERIOR      POSTERIOR   • TOTAL HIP ARTHROPLASTY Right 8/19/2016    Procedure: RT TOTAL HIP ARTHROPLASTY ANTERIOR WITH HANA TABLE;  Surgeon: Joe Gore MD;  Location: [facility] MAIN OR;  Service:    • TOTAL HIP ARTHROPLASTY Left 11/4/2016    Procedure: LT TOTAL HIP ARTHROPLASTY;  Surgeon: Joe Gore MD;  Location: [facility] MAIN OR;  Service:        Family History   Problem Relation Age of Onset   • Hypertension Mother    • Hypertension Father    • Hyperlipidemia Father    • Hypertension Sister    • Hypertension Brother        Social History     Socioeconomic History   • Marital status:      Spouse name: Not on file   • Number of children: Not on file   • Years of education: Not on file   • Highest education level: Not on file   Tobacco Use   • Smoking status: Current Every Day Smoker     Packs/day: 0.25     Years: 28.00     Pack years: 7.00     Types: Cigarettes   • Smokeless tobacco: Never Used   Substance and Sexual Activity   • Alcohol use: No     Comment: OCCASIONAL WEEKLY OR MONTHLY   • Drug use: Yes   • Sexual activity: Defer       Current Outpatient Medications on File Prior to Visit   Medication Sig Dispense Refill   • albuterol sulfate HFA (PROVENTIL HFA) 108 (90 Base) MCG/ACT inhaler Inhale 2 puffs 4 (Four) Times a Day. 1 inhaler 6   • amLODIPine (NORVASC) 10 MG tablet Take 1 tablet by mouth Daily. 90 tablet 1   • aspirin 325 MG tablet Take 325 mg by mouth Daily. Pt stopped preop, hasnt had since 9/28/16     • cyclobenzaprine (FLEXERIL) 10 MG tablet Take 1 tablet by mouth 3 (Three) Times a Day As Needed for muscle spasms. 56 tablet 0   • diclofenac (CATAFLAM) 50 MG tablet Take 1 tablet by mouth three times daily. 90 tablet 1   • hydroCHLOROthiazide (HYDRODIURIL) 25 MG tablet Take 1 tablet by mouth Daily. 90 tablet 1   • metoprolol succinate XL (TOPROL-XL) 50 MG 24 hr tablet Take 1 tablet by mouth Daily. 90 tablet 1   • nebivolol (BYSTOLIC) 10 MG tablet Take 10 mg by mouth Daily.     • potassium  chloride (K-DUR,KLOR-CON) 20 MEQ CR tablet Take 1 tablet by mouth Daily. Pt not taking bid as rx'ed. Takes one daily if needed for body cramps 30 tablet 6   • rosuvastatin (CRESTOR) 40 MG tablet Take 1 tablet by mouth Daily. 30 tablet 6   • torsemide (DEMADEX) 20 MG tablet Take 20 mg by mouth 2 (Two) Times a Day As Needed. Prn lower leg swelling; confirm dosage; pt not taking regularly     • valsartan (DIOVAN) 320 MG tablet Take 1 tablet by mouth Daily. 90 tablet 3   • [DISCONTINUED] HYDROcodone-acetaminophen (NORCO) 7.5-325 MG per tablet Take 1 tablet by mouth Every 6 (Six) Hours As Needed for Moderate Pain . 90 tablet 0   • diclofenac sodium (VOTAREN XR) 100 MG 24 hr tablet Take 100 mg by mouth Daily.       No current facility-administered medications on file prior to visit.        Review of Systems   Constitutional: Negative.    Respiratory: Negative.    Cardiovascular: Negative.    Musculoskeletal: Positive for arthralgias and back pain.       Recent Results (from the past 4704 hour(s))   Comprehensive Metabolic Panel    Collection Time: 10/21/19  1:29 PM   Result Value Ref Range    Glucose 95 65 - 99 mg/dL    BUN 15 6 - 24 mg/dL    Creatinine 1.01 0.76 - 1.27 mg/dL    eGFR Non African Am 86 >59 mL/min/1.73    eGFR African Am 99 >59 mL/min/1.73    BUN/Creatinine Ratio 15 9 - 20    Sodium 144 134 - 144 mmol/L    Potassium 4.0 3.5 - 5.2 mmol/L    Chloride 108 (H) 96 - 106 mmol/L    Total CO2 19 (L) 20 - 29 mmol/L    Calcium 9.3 8.7 - 10.2 mg/dL    Total Protein 6.6 6.0 - 8.5 g/dL    Albumin 4.5 3.5 - 5.5 g/dL    Globulin 2.1 1.5 - 4.5 g/dL    A/G Ratio 2.1 1.2 - 2.2    Total Bilirubin 0.3 0.0 - 1.2 mg/dL    Alkaline Phosphatase 63 39 - 117 IU/L    AST (SGOT) 15 0 - 40 IU/L    ALT (SGPT) 15 0 - 44 IU/L   Lipid Panel    Collection Time: 10/21/19  1:29 PM   Result Value Ref Range    Total Cholesterol 147 100 - 199 mg/dL    Triglycerides 53 0 - 149 mg/dL    HDL Cholesterol 38 (L) >39 mg/dL    VLDL Cholesterol 11 5 -  40 mg/dL    LDL Cholesterol  98 0 - 99 mg/dL   CBC & Differential    Collection Time: 10/21/19  1:29 PM   Result Value Ref Range    WBC 4.9 3.4 - 10.8 x10E3/uL    RBC 5.03 4.14 - 5.80 x10E6/uL    Hemoglobin 15.9 13.0 - 17.7 g/dL    Hematocrit 46.9 37.5 - 51.0 %    MCV 93 79 - 97 fL    MCH 31.6 26.6 - 33.0 pg    MCHC 33.9 31.5 - 35.7 g/dL    RDW 14.0 12.3 - 15.4 %    Platelets 210 150 - 450 x10E3/uL    Neutrophil Rel % 47 Not Estab. %    Lymphocyte Rel % 44 Not Estab. %    Monocyte Rel % 6 Not Estab. %    Eosinophil Rel % 3 Not Estab. %    Basophil Rel % 0 Not Estab. %    Neutrophils Absolute 2.3 1.4 - 7.0 x10E3/uL    Lymphocytes Absolute 2.2 0.7 - 3.1 x10E3/uL    Monocytes Absolute 0.3 0.1 - 0.9 x10E3/uL    Eosinophils Absolute 0.1 0.0 - 0.4 x10E3/uL    Basophils Absolute 0.0 0.0 - 0.2 x10E3/uL    Immature Granulocyte Rel % 0 Not Estab. %    Immature Grans Absolute 0.0 0.0 - 0.1 x10E3/uL   Compliance Drug Analysis, Ur - Urine, Clean Catch    Collection Time: 10/21/19  1:41 PM   Result Value Ref Range    Report Summary FINAL    CBC AND DIFFERENTIAL    Collection Time: 12/14/19  7:16 PM   Result Value Ref Range    WBC 5.02 4.5 - 11.0 10*3/uL    RBC 5.01 4.5 - 5.9 10*6/uL    Hemoglobin 15.7 13.5 - 17.5 g/dL    Hematocrit 46.6 41.0 - 53.0 %    MCV 93.0 80.0 - 100.0 fL    MCH 31.3 26.0 - 34.0 pg    MCHC 33.7 31.0 - 37.0 g/dL    RDW 12.8 12.0 - 16.8 %    Platelets 120 (L) 140 - 440 10*3/uL    MPV 11.6 (H) 6.7 - 10.8 fL    Differential [facility] CBC w/AutoDiff (arb'U)    Neutrophil Rel % 68.5 45 - 80 %    Lymphocyte Rel % 21.7 15 - 50 %    Monocyte Rel % 9.4 0 - 15 %    Eosinophil % 0.0 0 - 7 %    Basophil Rel % 0.2 0 - 2 %    Immature Grans % 0.2 (H) 0 %    nRBC 0 0 /100(WBC)    Neutrophils Absolute 3.44 2.0 - 8.8 10*3/uL    Lymphocytes Absolute 1.09 0.7 - 5.5 10*3/uL    Monocytes Absolute 0.47 0.0 - 1.7 10*3/uL    Eosinophils Absolute 0.00 0.0 - 0.8 10*3/uL    Basophils Absolute 0.01 0.0 - 0.2 10*3/uL    Immature  "Grans, Absolute 0.01 <1 10*3/uL   COMPREHENSIVE METABOLIC PANEL    Collection Time: 12/14/19  7:16 PM   Result Value Ref Range    Sodium 137 137 - 145 mmol/L    Potassium 3.5 3.5 - 5.1 mmol/L    Chloride 99 98 - 107 mmol/L    Total CO2 27 22 - 30 mmol/L    Glucose 102 (H) 74 - 99 mg/dL    BUN 11 7 - 20 mg/dL    Creatinine 1.3 0.7 - 1.5 mg/dL    BUN/Creatinine Ratio 8.5 RATIO    Est GFR by Clearance 58 (L) >60 /1.73 m2    Total Protein 7.2 6.3 - 8.2 g/dL    Albumin 3.9 3.5 - 5.0 g/dL    Globulin 3.3 1.5 - 4.5 g/dL    A/G Ratio 1.2 1.1 - 2.5 RATIO    Calcium 8.2 (L) 8.4 - 10.2 mg/dL    Total Bilirubin 0.4 0.2 - 1.3 mg/dL    AST (SGOT) 54 (H) 15 - 46 U/L    ALT (SGPT) 30 0 - 50 U/L    Alkaline Phosphatase 43 38 - 126 U/L   PROTIME-INR    Collection Time: 12/14/19  7:16 PM   Result Value Ref Range    Protime 13.1 10.3 - 13.3 s    INR 1.2 INR   APTT    Collection Time: 12/14/19  7:16 PM   Result Value Ref Range    PTT 20.7 (L) 25.3 - 35.0 s   LACTIC ACID, PLASMA    Collection Time: 12/14/19  7:16 PM   Result Value Ref Range    Lactate 1.3 0.7 - 2.0 mmol/L     Objective   Vitals:    03/19/20 1117   BP: 132/84   Pulse: 68   Temp: 98.1 °F (36.7 °C)   SpO2: 95%   Weight: 105 kg (231 lb 4 oz)   Height: 170.2 cm (67.01\")     Body mass index is 36.21 kg/m².  Physical Exam   Constitutional: He appears well-developed and well-nourished. No distress.   Cardiovascular: Normal rate and regular rhythm.   Pulmonary/Chest: Effort normal and breath sounds normal. No respiratory distress. He has no wheezes.   Skin: He is not diaphoretic.   Nursing note and vitals reviewed.        Assessment/Plan   Navneet was seen today for med refill, hypertension and hyperlipidemia.    Diagnoses and all orders for this visit:    Essential hypertension  -     Comprehensive Metabolic Panel  -     Lipid Panel    Healthcare maintenance  -     Cologuard - Stool, Per Rectum; Future    Colon cancer screening  -     Cologuard - Stool, Per Rectum; " Future    Osteoarthritis of right hip, unspecified osteoarthritis type  -     HYDROcodone-acetaminophen (NORCO) 7.5-325 MG per tablet; Take 1 tablet by mouth Every 6 (Six) Hours As Needed for Moderate Pain .    Hyperlipidemia, unspecified hyperlipidemia type  -     Comprehensive Metabolic Panel  -     Lipid Panel    Return in about 3 months (around 6/19/2020) for HYPERTENSION.      Patient has been counseled that the hydrocodone at 7.5 mg is not an appropriate chronic pain medication treatment.  Patient understands.  At this time Demetrio was reviewed and his compliance with treatment 3 urine drug screen was done in October.  Patient understands the treatment of her chronic pain should be done with long-acting opioids.  However at this time refuses to switch to long-acting cocci codon or morphine equivalent.  He is counseled about weaning his hydrocodone 7.5 mg and he is not taking it 3 times a day on a daily basis he is actually reducing his dose.  His last prescription lasted him 3 months.

## 2020-03-20 LAB
ALBUMIN SERPL-MCNC: 4.4 G/DL (ref 3.5–5.2)
ALBUMIN/GLOB SERPL: 1.8 G/DL
ALP SERPL-CCNC: 57 U/L (ref 39–117)
ALT SERPL-CCNC: 30 U/L (ref 1–41)
AST SERPL-CCNC: 16 U/L (ref 1–40)
BILIRUB SERPL-MCNC: 0.4 MG/DL (ref 0.2–1.2)
BUN SERPL-MCNC: 17 MG/DL (ref 6–20)
BUN/CREAT SERPL: 15.9 (ref 7–25)
CALCIUM SERPL-MCNC: 9.4 MG/DL (ref 8.6–10.5)
CHLORIDE SERPL-SCNC: 105 MMOL/L (ref 98–107)
CHOLEST SERPL-MCNC: 120 MG/DL (ref 0–200)
CO2 SERPL-SCNC: 27.8 MMOL/L (ref 22–29)
CREAT SERPL-MCNC: 1.07 MG/DL (ref 0.76–1.27)
GLOBULIN SER CALC-MCNC: 2.5 GM/DL
GLUCOSE SERPL-MCNC: 102 MG/DL (ref 65–99)
HDLC SERPL-MCNC: 37 MG/DL (ref 40–60)
LDLC SERPL CALC-MCNC: 69 MG/DL (ref 0–100)
POTASSIUM SERPL-SCNC: 4.1 MMOL/L (ref 3.5–5.2)
PROT SERPL-MCNC: 6.9 G/DL (ref 6–8.5)
SODIUM SERPL-SCNC: 145 MMOL/L (ref 136–145)
TRIGL SERPL-MCNC: 70 MG/DL (ref 0–150)
VLDLC SERPL CALC-MCNC: 14 MG/DL

## 2020-04-13 ENCOUNTER — TELEPHONE (OUTPATIENT)
Dept: FAMILY MEDICINE CLINIC | Facility: CLINIC | Age: 55
End: 2020-04-13

## 2020-04-13 NOTE — TELEPHONE ENCOUNTER
Called patient to inform him that we do not have any mask or gloves that he can get because we are running low ourselves

## 2020-04-13 NOTE — TELEPHONE ENCOUNTER
"PATIENT WAS CALLING TO CHECK THE STATUS ON HIS TRANSPORTATION AND MEAL DELIVERY SERVICES \"FORMS\" PATIENT STATED HE HAS SPOKEN WITH SOMEONE IN THE OFFICE IN THE LAST WEEK OR SO AND THEY TOLD HIM IT WOULD BE WORKED ON AND SOMEONE WOULD EITHER CALL HIM OR HE WOULD GET SOMETHING IN THE MAIL WITHIN A FEW DAYS. PATIENT STATED HE WAS INSTRUCTED BY THE OFFICE PERSON HE SPOKE WITH HIS INSURANCE COMPANY SHOULD BE \"FAXING OVER\" THE INFORMATION BUT AT THAT TIME THEY HAD NOT RECEIVED IT. PLEASE ADVISE AND CALL BACK PATIENT -[phone]       PATIENT ALSO ASKED IF THE OFFICE HAD ANY SPARE GLOVES OR MASKS FOR HIM TO HAVE.   "

## 2020-04-13 NOTE — TELEPHONE ENCOUNTER
Does this sound familiar? Is there any information you may be able to give me so I can work on getting this for patient. I read [employer]' messages about this but I do not have any faxes for this.

## 2020-04-13 NOTE — TELEPHONE ENCOUNTER
Spoke with patient about forms for transportation and meal delivery. Patient is going to call [facility] again to get them to fax over the forms once more due to us not receiving them yet.

## 2020-04-13 NOTE — TELEPHONE ENCOUNTER
PT WOULD LIKE TO KNOW IF HE CAN GET GLOVES AND MASKS? HE SAYS THAT HE IS HAVING A HARD TIME FINDING SOME, OR WHERE HE CAN GET SOME.

## 2020-04-13 NOTE — TELEPHONE ENCOUNTER
Sorry, I dont k ow about those. Jez might have done those and I just signed, but then copies should be in the chart. We may have to do those again, ask him to get them to us again if he needs it.

## 2020-04-15 ENCOUNTER — TELEPHONE (OUTPATIENT)
Dept: FAMILY MEDICINE CLINIC | Facility: CLINIC | Age: 55
End: 2020-04-15

## 2020-04-15 DIAGNOSIS — I10 ESSENTIAL HYPERTENSION: Primary | ICD-10-CM

## 2020-04-15 RX ORDER — VALSARTAN 320 MG/1
320 TABLET ORAL DAILY
Qty: 90 TABLET | Refills: 3 | Status: SHIPPED | OUTPATIENT
Start: 2020-04-15 | End: 2020-04-15

## 2020-04-15 RX ORDER — OLMESARTAN MEDOXOMIL 40 MG/1
40 TABLET ORAL DAILY
Qty: 90 TABLET | Refills: 3 | Status: SHIPPED | OUTPATIENT
Start: 2020-04-15 | End: 2021-04-09 | Stop reason: SDUPTHER

## 2020-04-16 DIAGNOSIS — M16.11 OSTEOARTHRITIS OF RIGHT HIP, UNSPECIFIED OSTEOARTHRITIS TYPE: ICD-10-CM

## 2020-04-16 RX ORDER — HYDROCODONE BITARTRATE AND ACETAMINOPHEN 7.5; 325 MG/1; MG/1
1 TABLET ORAL EVERY 6 HOURS PRN
Qty: 90 TABLET | Refills: 0 | Status: SHIPPED | OUTPATIENT
Start: 2020-04-16 | End: 2020-05-18 | Stop reason: SDUPTHER

## 2020-05-15 ENCOUNTER — TELEPHONE (OUTPATIENT)
Dept: FAMILY MEDICINE CLINIC | Facility: CLINIC | Age: 55
End: 2020-05-15

## 2020-05-15 DIAGNOSIS — M16.11 OSTEOARTHRITIS OF RIGHT HIP, UNSPECIFIED OSTEOARTHRITIS TYPE: ICD-10-CM

## 2020-05-15 RX ORDER — HYDROCODONE BITARTRATE AND ACETAMINOPHEN 7.5; 325 MG/1; MG/1
1 TABLET ORAL EVERY 6 HOURS PRN
Qty: 90 TABLET | Refills: 0 | Status: CANCELLED | OUTPATIENT
Start: 2020-05-15

## 2020-05-18 DIAGNOSIS — M16.11 OSTEOARTHRITIS OF RIGHT HIP, UNSPECIFIED OSTEOARTHRITIS TYPE: ICD-10-CM

## 2020-05-18 RX ORDER — HYDROCODONE BITARTRATE AND ACETAMINOPHEN 7.5; 325 MG/1; MG/1
1 TABLET ORAL EVERY 6 HOURS PRN
Qty: 90 TABLET | Refills: 0 | Status: CANCELLED | OUTPATIENT
Start: 2020-05-18

## 2020-05-18 RX ORDER — HYDROCODONE BITARTRATE AND ACETAMINOPHEN 7.5; 325 MG/1; MG/1
1 TABLET ORAL EVERY 6 HOURS PRN
Qty: 90 TABLET | Refills: 0 | Status: SHIPPED | OUTPATIENT
Start: 2020-05-18 | End: 2020-06-15 | Stop reason: SDUPTHER

## 2020-05-18 NOTE — TELEPHONE ENCOUNTER
Refilled Hydrocodone.  Last 2  Messages whoever sends them to us, did not specify which meds pt was asking to refill, I guess we have to guess?

## 2020-05-18 NOTE — TELEPHONE ENCOUNTER
PATIENT REQUESTED A REFILL ON:HYDROcodone-acetaminophen (NORCO) 7.5-325 MG per tablet    PATIENT CAN BE REACHED ON: [phone]    PHARMACY PREFERRED:[facility] #[id number] - [place] [id number] AT SEC OF [facility] - [phone]  - [phone] FX

## 2020-06-03 ENCOUNTER — TELEPHONE (OUTPATIENT)
Dept: FAMILY MEDICINE CLINIC | Facility: CLINIC | Age: 55
End: 2020-06-03

## 2020-06-03 NOTE — TELEPHONE ENCOUNTER
PATIENT CALLING IN TO ASK IF HE NEEDS TO COME INTO THE OFFICE FOR HIS 3MTH FU.  HE WOULD PREFER A MYCHART OR TELEHEALTH VISIT INSTEAD OFFICE.    DOES HE NEED LABS FOR HIS 3MTH CHECK UP.    PLEASE CALL AND ADVISE [phone]

## 2020-06-15 ENCOUNTER — TELEMEDICINE (OUTPATIENT)
Dept: FAMILY MEDICINE CLINIC | Facility: CLINIC | Age: 55
End: 2020-06-15

## 2020-06-15 DIAGNOSIS — M16.11 OSTEOARTHRITIS OF RIGHT HIP, UNSPECIFIED OSTEOARTHRITIS TYPE: ICD-10-CM

## 2020-06-15 DIAGNOSIS — E78.5 HYPERLIPIDEMIA, UNSPECIFIED HYPERLIPIDEMIA TYPE: Primary | ICD-10-CM

## 2020-06-15 DIAGNOSIS — I10 ESSENTIAL HYPERTENSION: ICD-10-CM

## 2020-06-15 PROCEDURE — 99214 OFFICE O/P EST MOD 30 MIN: CPT | Performed by: FAMILY MEDICINE

## 2020-06-15 RX ORDER — POTASSIUM CHLORIDE 20 MEQ/1
20 TABLET, EXTENDED RELEASE ORAL DAILY
Qty: 30 TABLET | Refills: 11 | Status: SHIPPED | OUTPATIENT
Start: 2020-06-15 | End: 2021-04-09 | Stop reason: SDUPTHER

## 2020-06-15 RX ORDER — HYDROCODONE BITARTRATE AND ACETAMINOPHEN 7.5; 325 MG/1; MG/1
1 TABLET ORAL EVERY 6 HOURS PRN
Qty: 90 TABLET | Refills: 0 | Status: SHIPPED | OUTPATIENT
Start: 2020-06-15 | End: 2020-07-16 | Stop reason: SDUPTHER

## 2020-06-15 NOTE — PROGRESS NOTES
Subjective   Navneet Roach is a 54 y.o. male.   Consent given    Visit via [employer]  Time 15 min    CC: follow up on meds.    Hypertension   This is a chronic problem. The current episode started more than 1 year ago. The problem is unchanged. The problem is controlled. Pertinent negatives include no blurred vision, chest pain, palpitations or shortness of breath.   Hyperlipidemia   This is a chronic problem. The current episode started more than 1 year ago. The problem is controlled. Recent lipid tests were reviewed and are normal. Pertinent negatives include no chest pain or shortness of breath.      Chronic low back pain/ osteoarthritis: stable on pain management.      The following portions of the patient's history were reviewed and updated as appropriate: allergies, current medications, past family history, past medical history, past social history, past surgical history and problem list.    Past Medical History:   Diagnosis Date   • Abnormal ECG    • Acute bronchitis    • Arthritis    • Blood in stool    • Constipation    • Coronary artery disease    • Cough    • DDD (degenerative disc disease), cervical    • Dyspnea    • Edema    • Fatigue    • Hemorrhoids    • Hip pain    • History of bronchitis     INHALER PRN   • History of echocardiogram    • History of kidney stones 06/2016   • History of long-term treatment with high-risk medication    • Hyperlipidemia    • Hypertension    • Insomnia    • Leg swelling     TAKES DIURETICS PRN   • Limb pain    • Limited joint range of motion (ROM)     NECK   • Low back pain    • Morbid obesity (CMS/HCC)    • Muscle spasm    • Neck pain    • Obesity    • Osteoarthritis    • Osteoarthritis of hips, bilateral    • Osteoarthritis of right hip    • Pain in right hip    • Peripheral neuropathy    • Rectal abscess    • Rectal pain    • Screening     STOP BANG   • Valvular heart disease        Past Surgical History:   Procedure Laterality Date   • CARDIAC CATHETERIZATION       DR KIM   • CERVICAL DISCECTOMY ANTERIOR      POSTERIOR   • TOTAL HIP ARTHROPLASTY Right 8/19/2016    Procedure: RT TOTAL HIP ARTHROPLASTY ANTERIOR WITH HANA TABLE;  Surgeon: Joe Gore MD;  Location: [facility] OR;  Service:    • TOTAL HIP ARTHROPLASTY Left 11/4/2016    Procedure: LT TOTAL HIP ARTHROPLASTY;  Surgeon: Joe Gore MD;  Location: [facility] MAIN OR;  Service:        Family History   Problem Relation Age of Onset   • Hypertension Mother    • Hypertension Father    • Hyperlipidemia Father    • Hypertension Sister    • Hypertension Brother        Social History     Socioeconomic History   • Marital status:      Spouse name: Not on file   • Number of children: Not on file   • Years of education: Not on file   • Highest education level: Not on file   Tobacco Use   • Smoking status: Current Every Day Smoker     Packs/day: 0.25     Years: 28.00     Pack years: 7.00     Types: Cigarettes   • Smokeless tobacco: Never Used   Substance and Sexual Activity   • Alcohol use: No     Comment: OCCASIONAL WEEKLY OR MONTHLY   • Drug use: Yes   • Sexual activity: Defer       Current Outpatient Medications on File Prior to Visit   Medication Sig Dispense Refill   • albuterol sulfate HFA (PROVENTIL HFA) 108 (90 Base) MCG/ACT inhaler Inhale 2 puffs 4 (Four) Times a Day. 1 inhaler 6   • amLODIPine (NORVASC) 10 MG tablet Take 1 tablet by mouth Daily. 90 tablet 1   • aspirin 325 MG tablet Take 325 mg by mouth Daily. Pt stopped preop, hasnt had since 9/28/16     • cyclobenzaprine (FLEXERIL) 10 MG tablet Take 1 tablet by mouth 3 (Three) Times a Day As Needed for muscle spasms. 56 tablet 0   • diclofenac (CATAFLAM) 50 MG tablet Take 1 tablet by mouth three times daily. 90 tablet 1   • diclofenac sodium (VOTAREN XR) 100 MG 24 hr tablet Take 100 mg by mouth Daily.     • hydroCHLOROthiazide (HYDRODIURIL) 25 MG tablet Take 1 tablet by mouth Daily. 90 tablet 1   • metoprolol succinate XL (TOPROL-XL) 50 MG 24 hr tablet  Take 1 tablet by mouth Daily. 90 tablet 1   • nebivolol (BYSTOLIC) 10 MG tablet Take 10 mg by mouth Daily.     • olmesartan (BENICAR) 40 MG tablet Take 1 tablet by mouth Daily. 90 tablet 3   • rosuvastatin (CRESTOR) 40 MG tablet Take 1 tablet by mouth Daily. 30 tablet 6   • torsemide (DEMADEX) 20 MG tablet Take 20 mg by mouth 2 (Two) Times a Day As Needed. Prn lower leg swelling; confirm dosage; pt not taking regularly     • [DISCONTINUED] HYDROcodone-acetaminophen (NORCO) 7.5-325 MG per tablet Take 1 tablet by mouth Every 6 (Six) Hours As Needed for Moderate Pain . 90 tablet 0   • [DISCONTINUED] potassium chloride (K-DUR,KLOR-CON) 20 MEQ CR tablet Take 1 tablet by mouth Daily. Pt not taking bid as rx'ed. Takes one daily if needed for body cramps 30 tablet 6     No current facility-administered medications on file prior to visit.        Review of Systems   Constitutional: Negative for fatigue.   Eyes: Negative for blurred vision.   Respiratory: Negative for cough, chest tightness and shortness of breath.    Cardiovascular: Negative for chest pain, palpitations and leg swelling.   Neurological: Negative for dizziness, light-headedness and headache.       Recent Results (from the past 4704 hour(s))   CBC AND DIFFERENTIAL    Collection Time: 12/14/19  7:16 PM   Result Value Ref Range    WBC 5.02 4.5 - 11.0 10*3/uL    RBC 5.01 4.5 - 5.9 10*6/uL    Hemoglobin 15.7 13.5 - 17.5 g/dL    Hematocrit 46.6 41.0 - 53.0 %    MCV 93.0 80.0 - 100.0 fL    MCH 31.3 26.0 - 34.0 pg    MCHC 33.7 31.0 - 37.0 g/dL    RDW 12.8 12.0 - 16.8 %    Platelets 120 (L) 140 - 440 10*3/uL    MPV 11.6 (H) 6.7 - 10.8 fL    Differential [facility] CBC w/AutoDiff (arb'U)    Neutrophil Rel % 68.5 45 - 80 %    Lymphocyte Rel % 21.7 15 - 50 %    Monocyte Rel % 9.4 0 - 15 %    Eosinophil % 0.0 0 - 7 %    Basophil Rel % 0.2 0 - 2 %    Immature Grans % 0.2 (H) 0 %    nRBC 0 0 /100(WBC)    Neutrophils Absolute 3.44 2.0 - 8.8 10*3/uL    Lymphocytes Absolute  1.09 0.7 - 5.5 10*3/uL    Monocytes Absolute 0.47 0.0 - 1.7 10*3/uL    Eosinophils Absolute 0.00 0.0 - 0.8 10*3/uL    Basophils Absolute 0.01 0.0 - 0.2 10*3/uL    Immature Grans, Absolute 0.01 <1 10*3/uL   COMPREHENSIVE METABOLIC PANEL    Collection Time: 12/14/19  7:16 PM   Result Value Ref Range    Sodium 137 137 - 145 mmol/L    Potassium 3.5 3.5 - 5.1 mmol/L    Chloride 99 98 - 107 mmol/L    Total CO2 27 22 - 30 mmol/L    Glucose 102 (H) 74 - 99 mg/dL    BUN 11 7 - 20 mg/dL    Creatinine 1.3 0.7 - 1.5 mg/dL    BUN/Creatinine Ratio 8.5 RATIO    Est GFR by Clearance 58 (L) >60 /1.73 m2    Total Protein 7.2 6.3 - 8.2 g/dL    Albumin 3.9 3.5 - 5.0 g/dL    Globulin 3.3 1.5 - 4.5 g/dL    A/G Ratio 1.2 1.1 - 2.5 RATIO    Calcium 8.2 (L) 8.4 - 10.2 mg/dL    Total Bilirubin 0.4 0.2 - 1.3 mg/dL    AST (SGOT) 54 (H) 15 - 46 U/L    ALT (SGPT) 30 0 - 50 U/L    Alkaline Phosphatase 43 38 - 126 U/L   PROTIME-INR    Collection Time: 12/14/19  7:16 PM   Result Value Ref Range    Protime 13.1 10.3 - 13.3 s    INR 1.2 INR   APTT    Collection Time: 12/14/19  7:16 PM   Result Value Ref Range    PTT 20.7 (L) 25.3 - 35.0 s   LACTIC ACID, PLASMA    Collection Time: 12/14/19  7:16 PM   Result Value Ref Range    Lactate 1.3 0.7 - 2.0 mmol/L   Comprehensive Metabolic Panel    Collection Time: 03/19/20 11:44 AM   Result Value Ref Range    Glucose 102 (H) 65 - 99 mg/dL    BUN 17 6 - 20 mg/dL    Creatinine 1.07 0.76 - 1.27 mg/dL    eGFR Non African Am 73 >60 mL/min/1.73    eGFR African Am 88 >60 mL/min/1.73    BUN/Creatinine Ratio 15.9 7.0 - 25.0    Sodium 145 136 - 145 mmol/L    Potassium 4.1 3.5 - 5.2 mmol/L    Chloride 105 98 - 107 mmol/L    Total CO2 27.8 22.0 - 29.0 mmol/L    Calcium 9.4 8.6 - 10.5 mg/dL    Total Protein 6.9 6.0 - 8.5 g/dL    Albumin 4.40 3.50 - 5.20 g/dL    Globulin 2.5 gm/dL    A/G Ratio 1.8 g/dL    Total Bilirubin 0.4 0.2 - 1.2 mg/dL    Alkaline Phosphatase 57 39 - 117 U/L    AST (SGOT) 16 1 - 40 U/L    ALT (SGPT) 30  1 - 41 U/L   Lipid Panel    Collection Time: 03/19/20 11:44 AM   Result Value Ref Range    Total Cholesterol 120 0 - 200 mg/dL    Triglycerides 70 0 - 150 mg/dL    HDL Cholesterol 37 (L) 40 - 60 mg/dL    VLDL Cholesterol 14 mg/dL    LDL Cholesterol  69 0 - 100 mg/dL     Objective   There were no vitals filed for this visit.  There is no height or weight on file to calculate BMI.  Physical Exam   Constitutional: He appears well-developed and well-nourished. No distress.   Skin: He is not diaphoretic.   Nursing note and vitals reviewed.        Assessment/Plan   Diagnoses and all orders for this visit:    Hyperlipidemia, unspecified hyperlipidemia type    Essential hypertension  -     potassium chloride (K-DUR,KLOR-CON) 20 MEQ CR tablet; Take 1 tablet by mouth Daily. Pt not taking bid as rx'ed. Takes one daily if needed for body cramps    Osteoarthritis of right hip, unspecified osteoarthritis type  -     HYDROcodone-acetaminophen (NORCO) 7.5-325 MG per tablet; Take 1 tablet by mouth Every 6 (Six) Hours As Needed for Moderate Pain .      Return in about 3 months (around 9/15/2020).

## 2020-07-15 DIAGNOSIS — M16.11 OSTEOARTHRITIS OF RIGHT HIP, UNSPECIFIED OSTEOARTHRITIS TYPE: ICD-10-CM

## 2020-07-15 NOTE — TELEPHONE ENCOUNTER
Pt called in for refill on medication     HYDROcodone-acetaminophen (NORCO) 7.5-325 MG per tablet    [id number] [place], [place] - [address] AT [facility] - [phone] PH - [phone] (Phone)  [phone] (Fax)

## 2020-07-16 RX ORDER — HYDROCODONE BITARTRATE AND ACETAMINOPHEN 7.5; 325 MG/1; MG/1
1 TABLET ORAL EVERY 6 HOURS PRN
Qty: 90 TABLET | Refills: 0 | Status: SHIPPED | OUTPATIENT
Start: 2020-07-16 | End: 2020-08-13 | Stop reason: SDUPTHER

## 2020-08-13 DIAGNOSIS — M16.11 OSTEOARTHRITIS OF RIGHT HIP, UNSPECIFIED OSTEOARTHRITIS TYPE: ICD-10-CM

## 2020-08-13 RX ORDER — HYDROCODONE BITARTRATE AND ACETAMINOPHEN 7.5; 325 MG/1; MG/1
1 TABLET ORAL EVERY 6 HOURS PRN
Qty: 90 TABLET | Refills: 0 | Status: SHIPPED | OUTPATIENT
Start: 2020-08-13 | End: 2020-09-14 | Stop reason: SDUPTHER

## 2020-08-31 ENCOUNTER — RESULTS ENCOUNTER (OUTPATIENT)
Dept: FAMILY MEDICINE CLINIC | Facility: CLINIC | Age: 55
End: 2020-08-31

## 2020-08-31 DIAGNOSIS — Z12.11 COLON CANCER SCREENING: Primary | ICD-10-CM

## 2020-08-31 DIAGNOSIS — Z12.11 COLON CANCER SCREENING: ICD-10-CM

## 2020-09-01 ENCOUNTER — OFFICE VISIT (OUTPATIENT)
Dept: FAMILY MEDICINE CLINIC | Facility: CLINIC | Age: 55
End: 2020-09-01

## 2020-09-01 VITALS
HEIGHT: 67 IN | HEART RATE: 68 BPM | OXYGEN SATURATION: 95 % | BODY MASS INDEX: 38.63 KG/M2 | TEMPERATURE: 97.7 F | WEIGHT: 246.13 LBS | DIASTOLIC BLOOD PRESSURE: 86 MMHG | SYSTOLIC BLOOD PRESSURE: 128 MMHG

## 2020-09-01 DIAGNOSIS — M79.644 PAIN OF RIGHT THUMB: ICD-10-CM

## 2020-09-01 DIAGNOSIS — E78.2 MIXED HYPERLIPIDEMIA: ICD-10-CM

## 2020-09-01 DIAGNOSIS — I10 ESSENTIAL HYPERTENSION: Primary | ICD-10-CM

## 2020-09-01 DIAGNOSIS — I25.118 CORONARY ARTERY DISEASE OF NATIVE HEART WITH STABLE ANGINA PECTORIS, UNSPECIFIED VESSEL OR LESION TYPE (HCC): ICD-10-CM

## 2020-09-01 PROCEDURE — 99214 OFFICE O/P EST MOD 30 MIN: CPT | Performed by: FAMILY MEDICINE

## 2020-09-01 NOTE — PROGRESS NOTES
"Subjective   Navneet Roach is a 54 y.o. male.     Chief Complaint   Patient presents with   • Hypertension   • Follow-up   • right thumb- arthritis pain     right   • pinky- numb and tingling.     right   • 3 month follow up     medication       Hypertension   This is a chronic problem. The current episode started more than 1 year ago. The problem is unchanged. The problem is controlled.   Hyperlipidemia   This is a chronic problem. The current episode started more than 1 year ago. The problem is controlled. Recent lipid tests were reviewed and are normal.   Osteoarthritis   Presents for follow-up (WORSENING) visit. His past medical history is significant for osteoarthritis.      CAD is stable seeing a cardiologist.    Patient is complaining on right thumb and right pinky finger  Pain  Right pinky feels to patient is burning tingling and numb.  On the right thumb he feels pain on the distal and proximal PIPs and patient reports a \"pop\".    The following portions of the patient's history were reviewed and updated as appropriate: allergies, current medications, past family history, past medical history, past social history, past surgical history and problem list.    Past Medical History:   Diagnosis Date   • Abnormal ECG    • Acute bronchitis    • Arthritis    • Blood in stool    • Constipation    • Coronary artery disease    • Cough    • DDD (degenerative disc disease), cervical    • Dyspnea    • Edema    • Fatigue    • Hemorrhoids    • Hip pain    • History of bronchitis     INHALER PRN   • History of echocardiogram    • History of kidney stones 06/2016   • History of long-term treatment with high-risk medication    • Hyperlipidemia    • Hypertension    • Insomnia    • Leg swelling     TAKES DIURETICS PRN   • Limb pain    • Limited joint range of motion (ROM)     NECK   • Low back pain    • Morbid obesity (CMS/HCC)    • Muscle spasm    • Neck pain    • Obesity    • Osteoarthritis    • Osteoarthritis of hips, " bilateral    • Osteoarthritis of right hip    • Pain in right hip    • Peripheral neuropathy    • Rectal abscess    • Rectal pain    • Screening     STOP BANG   • Valvular heart disease        Past Surgical History:   Procedure Laterality Date   • CARDIAC CATHETERIZATION      DR KIM   • CERVICAL DISCECTOMY ANTERIOR      POSTERIOR   • TOTAL HIP ARTHROPLASTY Right 8/19/2016    Procedure: RT TOTAL HIP ARTHROPLASTY ANTERIOR WITH HANA TABLE;  Surgeon: Joe Gore MD;  Location: [facility] OR;  Service:    • TOTAL HIP ARTHROPLASTY Left 11/4/2016    Procedure: LT TOTAL HIP ARTHROPLASTY;  Surgeon: Joe Gore MD;  Location: [facility] OR;  Service:        Family History   Problem Relation Age of Onset   • Hypertension Mother    • Hypertension Father    • Hyperlipidemia Father    • Hypertension Sister    • Hypertension Brother        Social History     Socioeconomic History   • Marital status:      Spouse name: Not on file   • Number of children: Not on file   • Years of education: Not on file   • Highest education level: Not on file   Tobacco Use   • Smoking status: Current Every Day Smoker     Packs/day: 0.25     Years: 28.00     Pack years: 7.00     Types: Cigarettes   • Smokeless tobacco: Never Used   Substance and Sexual Activity   • Alcohol use: No     Comment: OCCASIONAL WEEKLY OR MONTHLY   • Drug use: Yes   • Sexual activity: Defer       Current Outpatient Medications on File Prior to Visit   Medication Sig Dispense Refill   • albuterol sulfate HFA (PROVENTIL HFA) 108 (90 Base) MCG/ACT inhaler Inhale 2 puffs 4 (Four) Times a Day. 1 inhaler 6   • amLODIPine (NORVASC) 10 MG tablet Take 1 tablet by mouth Daily. 90 tablet 1   • aspirin 325 MG tablet Take 325 mg by mouth Daily. Pt stopped preop, hasnt had since 9/28/16     • cyclobenzaprine (FLEXERIL) 10 MG tablet Take 1 tablet by mouth 3 (Three) Times a Day As Needed for muscle spasms. 56 tablet 0   • diclofenac (CATAFLAM) 50 MG tablet Take 1 tablet  by mouth three times daily. 90 tablet 1   • diclofenac sodium (VOTAREN XR) 100 MG 24 hr tablet Take 100 mg by mouth Daily.     • hydroCHLOROthiazide (HYDRODIURIL) 25 MG tablet Take 1 tablet by mouth Daily. 90 tablet 1   • HYDROcodone-acetaminophen (NORCO) 7.5-325 MG per tablet Take 1 tablet by mouth Every 6 (Six) Hours As Needed for Moderate Pain . 90 tablet 0   • metoprolol succinate XL (TOPROL-XL) 50 MG 24 hr tablet Take 1 tablet by mouth Daily. 90 tablet 1   • nebivolol (BYSTOLIC) 10 MG tablet Take 10 mg by mouth Daily.     • olmesartan (BENICAR) 40 MG tablet Take 1 tablet by mouth Daily. 90 tablet 3   • potassium chloride (K-DUR,KLOR-CON) 20 MEQ CR tablet Take 1 tablet by mouth Daily. Pt not taking bid as rx'ed. Takes one daily if needed for body cramps 30 tablet 11   • rosuvastatin (CRESTOR) 40 MG tablet Take 1 tablet by mouth Daily. 30 tablet 6   • torsemide (DEMADEX) 20 MG tablet Take 20 mg by mouth 2 (Two) Times a Day As Needed. Prn lower leg swelling; confirm dosage; pt not taking regularly       No current facility-administered medications on file prior to visit.        Review of Systems   Musculoskeletal: Positive for arthralgias.   All other systems reviewed and are negative.      Recent Results (from the past 4704 hour(s))   Comprehensive Metabolic Panel    Collection Time: 03/19/20 11:44 AM   Result Value Ref Range    Glucose 102 (H) 65 - 99 mg/dL    BUN 17 6 - 20 mg/dL    Creatinine 1.07 0.76 - 1.27 mg/dL    eGFR Non African Am 73 >60 mL/min/1.73    eGFR African Am 88 >60 mL/min/1.73    BUN/Creatinine Ratio 15.9 7.0 - 25.0    Sodium 145 136 - 145 mmol/L    Potassium 4.1 3.5 - 5.2 mmol/L    Chloride 105 98 - 107 mmol/L    Total CO2 27.8 22.0 - 29.0 mmol/L    Calcium 9.4 8.6 - 10.5 mg/dL    Total Protein 6.9 6.0 - 8.5 g/dL    Albumin 4.40 3.50 - 5.20 g/dL    Globulin 2.5 gm/dL    A/G Ratio 1.8 g/dL    Total Bilirubin 0.4 0.2 - 1.2 mg/dL    Alkaline Phosphatase 57 39 - 117 U/L    AST (SGOT) 16 1 - 40 U/L  "   ALT (SGPT) 30 1 - 41 U/L   Lipid Panel    Collection Time: 03/19/20 11:44 AM   Result Value Ref Range    Total Cholesterol 120 0 - 200 mg/dL    Triglycerides 70 0 - 150 mg/dL    HDL Cholesterol 37 (L) 40 - 60 mg/dL    VLDL Cholesterol 14 mg/dL    LDL Cholesterol  69 0 - 100 mg/dL     Objective   Vitals:    09/01/20 1113   BP: 128/86   Pulse: 68   Temp: 97.7 °F (36.5 °C)   SpO2: 95%   Weight: 112 kg (246 lb 2 oz)   Height: 170.2 cm (67.01\")     Body mass index is 38.54 kg/m².  Physical Exam   Constitutional: He appears well-developed and well-nourished. No distress.   Cardiovascular: Normal rate and regular rhythm.   Pulmonary/Chest: Effort normal and breath sounds normal. No respiratory distress. He has no wheezes.   Musculoskeletal:   Examination the right fifth digit no abnormalities.  Examination right thumb he does have diffuse tenderness on palpation   Skin: He is not diaphoretic.   Nursing note and vitals reviewed.        Navneet was seen today for hypertension, follow-up, right thumb- arthritis pain, pinky- numb and tingling. and 3 month follow up.    Diagnoses and all orders for this visit:    Essential hypertension  -     Comprehensive Metabolic Panel  -     Lipid Panel  -     CBC & Differential    Coronary artery disease of native heart with stable angina pectoris, unspecified vessel or lesion type (CMS/HCC)  -     Comprehensive Metabolic Panel  -     Lipid Panel  -     CBC & Differential    Mixed hyperlipidemia  -     Comprehensive Metabolic Panel  -     Lipid Panel  -     CBC & Differential    Pain of right thumb  -     Ambulatory Referral to Hand Surgery      Return in about 7 weeks (around 10/22/2020).          "

## 2020-09-02 LAB
ALBUMIN SERPL-MCNC: 4.3 G/DL (ref 3.5–5.2)
ALBUMIN/GLOB SERPL: 1.4 G/DL
ALP SERPL-CCNC: 77 U/L (ref 39–117)
ALT SERPL-CCNC: 23 U/L (ref 1–41)
AST SERPL-CCNC: 16 U/L (ref 1–40)
BASOPHILS # BLD AUTO: 0.04 10*3/MM3 (ref 0–0.2)
BASOPHILS NFR BLD AUTO: 0.7 % (ref 0–1.5)
BILIRUB SERPL-MCNC: 0.2 MG/DL (ref 0–1.2)
BUN SERPL-MCNC: 14 MG/DL (ref 6–20)
BUN/CREAT SERPL: 12.6 (ref 7–25)
CALCIUM SERPL-MCNC: 10.1 MG/DL (ref 8.6–10.5)
CHLORIDE SERPL-SCNC: 103 MMOL/L (ref 98–107)
CHOLEST SERPL-MCNC: 226 MG/DL (ref 0–200)
CO2 SERPL-SCNC: 28.2 MMOL/L (ref 22–29)
CREAT SERPL-MCNC: 1.11 MG/DL (ref 0.76–1.27)
EOSINOPHIL # BLD AUTO: 0.2 10*3/MM3 (ref 0–0.4)
EOSINOPHIL NFR BLD AUTO: 3.3 % (ref 0.3–6.2)
ERYTHROCYTE [DISTWIDTH] IN BLOOD BY AUTOMATED COUNT: 12.9 % (ref 12.3–15.4)
GLOBULIN SER CALC-MCNC: 3.1 GM/DL
GLUCOSE SERPL-MCNC: 98 MG/DL (ref 65–99)
HCT VFR BLD AUTO: 48.4 % (ref 37.5–51)
HDLC SERPL-MCNC: 38 MG/DL (ref 40–60)
HGB BLD-MCNC: 16.3 G/DL (ref 13–17.7)
IMM GRANULOCYTES # BLD AUTO: 0.02 10*3/MM3 (ref 0–0.05)
IMM GRANULOCYTES NFR BLD AUTO: 0.3 % (ref 0–0.5)
LDLC SERPL CALC-MCNC: 149 MG/DL (ref 0–100)
LYMPHOCYTES # BLD AUTO: 2.15 10*3/MM3 (ref 0.7–3.1)
LYMPHOCYTES NFR BLD AUTO: 35.8 % (ref 19.6–45.3)
MCH RBC QN AUTO: 31.5 PG (ref 26.6–33)
MCHC RBC AUTO-ENTMCNC: 33.7 G/DL (ref 31.5–35.7)
MCV RBC AUTO: 93.4 FL (ref 79–97)
MONOCYTES # BLD AUTO: 0.44 10*3/MM3 (ref 0.1–0.9)
MONOCYTES NFR BLD AUTO: 7.3 % (ref 5–12)
NEUTROPHILS # BLD AUTO: 3.16 10*3/MM3 (ref 1.7–7)
NEUTROPHILS NFR BLD AUTO: 52.6 % (ref 42.7–76)
NRBC BLD AUTO-RTO: 0.2 /100 WBC (ref 0–0.2)
PLATELET # BLD AUTO: 210 10*3/MM3 (ref 140–450)
POTASSIUM SERPL-SCNC: 4.1 MMOL/L (ref 3.5–5.2)
PROT SERPL-MCNC: 7.4 G/DL (ref 6–8.5)
RBC # BLD AUTO: 5.18 10*6/MM3 (ref 4.14–5.8)
SODIUM SERPL-SCNC: 140 MMOL/L (ref 136–145)
TRIGL SERPL-MCNC: 196 MG/DL (ref 0–150)
VLDLC SERPL CALC-MCNC: 39.2 MG/DL (ref 5–40)
WBC # BLD AUTO: 6.01 10*3/MM3 (ref 3.4–10.8)

## 2020-09-11 DIAGNOSIS — M16.11 OSTEOARTHRITIS OF RIGHT HIP, UNSPECIFIED OSTEOARTHRITIS TYPE: ICD-10-CM

## 2020-09-11 NOTE — TELEPHONE ENCOUNTER
Pt is requesting a refill on the following medication,   HYDROcodone-acetaminophen (NORCO) 7.5-325 MG per tablet        Sig: Take 1 tablet by mouth Every 6 (Six) Hours As Needed for Moderate Pain .        [facility]

## 2020-09-14 RX ORDER — HYDROCODONE BITARTRATE AND ACETAMINOPHEN 7.5; 325 MG/1; MG/1
1 TABLET ORAL EVERY 6 HOURS PRN
Qty: 90 TABLET | Refills: 0 | Status: SHIPPED | OUTPATIENT
Start: 2020-09-14 | End: 2020-10-13 | Stop reason: SDUPTHER

## 2020-10-13 DIAGNOSIS — M16.11 OSTEOARTHRITIS OF RIGHT HIP, UNSPECIFIED OSTEOARTHRITIS TYPE: ICD-10-CM

## 2020-10-13 RX ORDER — HYDROCODONE BITARTRATE AND ACETAMINOPHEN 7.5; 325 MG/1; MG/1
1 TABLET ORAL EVERY 6 HOURS PRN
Qty: 90 TABLET | Refills: 0 | Status: SHIPPED | OUTPATIENT
Start: 2020-10-13 | End: 2020-11-12 | Stop reason: SDUPTHER

## 2020-10-13 NOTE — TELEPHONE ENCOUNTER
Requesting refill be sent to [facility] for Hydrocodone. Would like done today because he is going out of town

## 2020-10-22 ENCOUNTER — OFFICE VISIT (OUTPATIENT)
Dept: FAMILY MEDICINE CLINIC | Facility: CLINIC | Age: 55
End: 2020-10-22

## 2020-10-22 VITALS
HEART RATE: 65 BPM | WEIGHT: 252 LBS | TEMPERATURE: 97.3 F | SYSTOLIC BLOOD PRESSURE: 147 MMHG | BODY MASS INDEX: 39.55 KG/M2 | OXYGEN SATURATION: 95 % | HEIGHT: 67 IN | DIASTOLIC BLOOD PRESSURE: 87 MMHG

## 2020-10-22 DIAGNOSIS — Z23 IMMUNIZATION DUE: ICD-10-CM

## 2020-10-22 DIAGNOSIS — M79.644 THUMB PAIN, RIGHT: ICD-10-CM

## 2020-10-22 DIAGNOSIS — Z00.00 MEDICARE ANNUAL WELLNESS VISIT, SUBSEQUENT: Primary | ICD-10-CM

## 2020-10-22 PROCEDURE — G0439 PPPS, SUBSEQ VISIT: HCPCS | Performed by: FAMILY MEDICINE

## 2020-10-22 PROCEDURE — 90471 IMMUNIZATION ADMIN: CPT | Performed by: FAMILY MEDICINE

## 2020-10-22 PROCEDURE — 90670 PCV13 VACCINE IM: CPT | Performed by: FAMILY MEDICINE

## 2020-10-22 RX ORDER — METHYLPREDNISOLONE 4 MG/1
TABLET ORAL
Qty: 21 EACH | Refills: 0 | Status: SHIPPED | OUTPATIENT
Start: 2020-10-22 | End: 2021-01-08

## 2020-10-22 NOTE — PROGRESS NOTES
The ABCs of the Annual Wellness Visit  Subsequent Medicare Wellness Visit    Chief Complaint   Patient presents with   • Medicare Wellness-subsequent       Subjective   History of Present Illness:  Navneet Roach is a 54 y.o. male who presents for a Subsequent Medicare Wellness Visit.    HEALTH RISK ASSESSMENT    Recent Hospitalizations:  No hospitalization(s) within the last year.    Current Medical Providers:  Patient Care Team:  Payal Bernardo MD as PCP - General (Family Medicine)  Amarilis Beasley MD as Consulting Physician (Cardiology)    Smoking Status:  Social History     Tobacco Use   Smoking Status Current Every Day Smoker   • Packs/day: 0.25   • Years: 28.00   • Pack years: 7.00   • Types: Cigarettes   Smokeless Tobacco Never Used       Alcohol Consumption:  Social History     Substance and Sexual Activity   Alcohol Use No    Comment: OCCASIONAL WEEKLY OR MONTHLY       Depression Screen:   PHQ-2/PHQ-9 Depression Screening 10/22/2020   Little interest or pleasure in doing things 0   Feeling down, depressed, or hopeless 0   Trouble falling or staying asleep, or sleeping too much -   Feeling tired or having little energy -   Poor appetite or overeating -   Feeling bad about yourself - or that you are a failure or have let yourself or your family down -   Trouble concentrating on things, such as reading the newspaper or watching television -   Moving or speaking so slowly that other people could have noticed. Or the opposite - being so fidgety or restless that you have been moving around a lot more than usual -   Thoughts that you would be better off dead, or of hurting yourself in some way -   Total Score 0   If you checked off any problems, how difficult have these problems made it for you to do your work, take care of things at home, or get along with other people? -       Fall Risk Screen:  STEADI Fall Risk Assessment was completed, and patient is at LOW risk for falls.Assessment completed  on:10/22/2020    Health Habits and Functional and Cognitive Screening:  Functional & Cognitive Status 10/22/2020   Do you have difficulty preparing food and eating? No   Do you have difficulty bathing yourself, getting dressed or grooming yourself? No   Do you have difficulty using the toilet? No   Do you have difficulty moving around from place to place? No   Do you have trouble with steps or getting out of a bed or a chair? Yes   Current Diet Frequent Junk Food   Dental Exam Not up to date   Eye Exam Up to date   Exercise (times per week) 7 times per week   Current Exercises Include Walking   Current Exercise Activities Include Walking   Do you need help using the phone?  No   Are you deaf or do you have serious difficulty hearing?  No   Do you need help with transportation? No   Do you need help shopping? No   Do you need help preparing meals?  No   Do you need help with housework?  No   Do you need help with laundry? No   Do you need help taking your medications? No   Do you need help managing money? No   Do you ever drive or ride in a car without wearing a seat belt? No   Have you felt unusual stress, anger or loneliness in the last month? Yes   Who do you live with? Alone   If you need help, do you have trouble finding someone available to you? No   Have you been bothered in the last four weeks by sexual problems? No   Do you have difficulty concentrating, remembering or making decisions? No         Does the patient have evidence of cognitive impairment? No    Asprin use counseling:Taking ASA appropriately as indicated    Age-appropriate Screening Schedule:  Refer to the list below for future screening recommendations based on patient's age, sex and/or medical conditions. Orders for these recommended tests are listed in the plan section. The patient has been provided with a written plan.    Health Maintenance   Topic Date Due   • ZOSTER VACCINE (1 of 2) 12/29/2017   • LIPID PANEL  09/01/2021   • INFLUENZA  VACCINE  Discontinued   • TDAP/TD VACCINES  Discontinued          The following portions of the patient's history were reviewed and updated as appropriate: allergies, current medications, past family history, past medical history, past social history, past surgical history and problem list.    Outpatient Medications Prior to Visit   Medication Sig Dispense Refill   • albuterol sulfate HFA (PROVENTIL HFA) 108 (90 Base) MCG/ACT inhaler Inhale 2 puffs 4 (Four) Times a Day. 1 inhaler 6   • amLODIPine (NORVASC) 10 MG tablet Take 1 tablet by mouth Daily. 90 tablet 1   • aspirin 325 MG tablet Take 325 mg by mouth Daily. Pt stopped preop, hasnt had since 9/28/16     • cyclobenzaprine (FLEXERIL) 10 MG tablet Take 1 tablet by mouth 3 (Three) Times a Day As Needed for muscle spasms. 56 tablet 0   • diclofenac (CATAFLAM) 50 MG tablet Take 1 tablet by mouth three times daily. 90 tablet 1   • diclofenac sodium (VOTAREN XR) 100 MG 24 hr tablet Take 100 mg by mouth Daily.     • hydroCHLOROthiazide (HYDRODIURIL) 25 MG tablet Take 1 tablet by mouth Daily. 90 tablet 1   • HYDROcodone-acetaminophen (NORCO) 7.5-325 MG per tablet Take 1 tablet by mouth Every 6 (Six) Hours As Needed for Moderate Pain . 90 tablet 0   • metoprolol succinate XL (TOPROL-XL) 50 MG 24 hr tablet Take 1 tablet by mouth Daily. 90 tablet 1   • nebivolol (BYSTOLIC) 10 MG tablet Take 10 mg by mouth Daily.     • olmesartan (BENICAR) 40 MG tablet Take 1 tablet by mouth Daily. 90 tablet 3   • potassium chloride (K-DUR,KLOR-CON) 20 MEQ CR tablet Take 1 tablet by mouth Daily. Pt not taking bid as rx'ed. Takes one daily if needed for body cramps 30 tablet 11   • rosuvastatin (CRESTOR) 40 MG tablet Take 1 tablet by mouth Daily. 30 tablet 6   • torsemide (DEMADEX) 20 MG tablet Take 20 mg by mouth 2 (Two) Times a Day As Needed. Prn lower leg swelling; confirm dosage; pt not taking regularly       No facility-administered medications prior to visit.        Patient Active  "Problem List   Diagnosis   • Osteoarthritis of right hip   • Primary osteoarthritis of left hip   • Anemia, posthemorrhagic, acute   • DAVID (acute kidney injury) (CMS/[facility])   • CKD (chronic kidney disease)   • HTN (hypertension)   • CAD (coronary artery disease)   • Hyperlipidemia   • Medicare annual wellness visit, subsequent   • Hip pain, bilateral   • Chronic neck pain   • Status post lumbar spine operative procedure for decompression of spinal cord   • Morbidly obese (CMS/[facility])       Advanced Care Planning:  ACP discussion was held with the patient during this visit. Patient does not have an advance directive, information provided.    Review of Systems   Constitutional: Negative.        Compared to one year ago, the patient feels his physical health is the same.  Compared to one year ago, the patient feels his mental health is the same.    Reviewed chart for potential of high risk medication in the elderly: yes  Reviewed chart for potential of harmful drug interactions in the elderly:yes    Objective         Vitals:    10/22/20 1027   BP: 147/87   Pulse: 65   Temp: 97.3 °F (36.3 °C)   SpO2: 95%   Weight: 114 kg (252 lb)   Height: 170.2 cm (67.01\")       Body mass index is 39.46 kg/m².  Discussed the patient's BMI with him. The BMI is above average; BMI management plan is completed.    Physical Exam  Vitals signs and nursing note reviewed.   Constitutional:       General: He is not in acute distress.     Appearance: He is well-developed. He is not diaphoretic.   Cardiovascular:      Rate and Rhythm: Normal rate and regular rhythm.   Pulmonary:      Effort: Pulmonary effort is normal. No respiratory distress.      Breath sounds: Normal breath sounds. No wheezing.         Lab Results   Component Value Date    GLU 98 09/01/2020    CHLPL 226 (H) 09/01/2020    TRIG 196 (H) 09/01/2020    HDL 38 (L) 09/01/2020     (H) 09/01/2020    VLDL 39.2 09/01/2020        Assessment/Plan   Medicare Risks and Personalized Health " Plan  CMS Preventative Services Quick Reference  Fall Risk    The above risks/problems have been discussed with the patient.  Pertinent information has been shared with the patient in the After Visit Summary.  Follow up plans and orders are seen below in the Assessment/Plan Section.    Diagnoses and all orders for this visit:    1. Medicare annual wellness visit, subsequent (Primary)    2. Thumb pain, right  -     Uric Acid  -     methylPREDNISolone (MEDROL) 4 MG dose pack; Take as directed on package instructions.  Dispense: 21 each; Refill: 0    3. Immunization due  -     Pneumococcal Conjugate Vaccine 13-Valent All      Follow Up:  Return in about 1 year (around 10/22/2021) for Medicare Wellness.     An After Visit Summary and PPPS were given to the patient.

## 2020-10-23 LAB — URATE SERPL-MCNC: 5.9 MG/DL (ref 3.4–7)

## 2020-11-11 DIAGNOSIS — M16.11 OSTEOARTHRITIS OF RIGHT HIP, UNSPECIFIED OSTEOARTHRITIS TYPE: ICD-10-CM

## 2020-11-12 RX ORDER — HYDROCODONE BITARTRATE AND ACETAMINOPHEN 7.5; 325 MG/1; MG/1
1 TABLET ORAL EVERY 6 HOURS PRN
Qty: 90 TABLET | Refills: 0 | Status: SHIPPED | OUTPATIENT
Start: 2020-11-12 | End: 2020-12-09 | Stop reason: SDUPTHER

## 2020-11-30 ENCOUNTER — RESULTS ENCOUNTER (OUTPATIENT)
Dept: FAMILY MEDICINE CLINIC | Facility: CLINIC | Age: 55
End: 2020-11-30

## 2020-11-30 ENCOUNTER — TELEPHONE (OUTPATIENT)
Dept: FAMILY MEDICINE CLINIC | Facility: CLINIC | Age: 55
End: 2020-11-30

## 2020-11-30 DIAGNOSIS — Z12.11 COLON CANCER SCREENING: ICD-10-CM

## 2020-11-30 DIAGNOSIS — Z12.11 COLON CANCER SCREENING: Primary | ICD-10-CM

## 2020-11-30 NOTE — TELEPHONE ENCOUNTER
Patient is calling to state he missed 2 appointments with [facility] and they will not re-schedule.  Patient is requesting a referral for Ortho for right hand.    Patient is also stating he messed up the first Cologuard test and he is needing a new test to be sent out to him.  He states he spoke with the Cologuard representative and was told his PCP has to take care of this.    Please advise.    Patient call back [phone]

## 2020-12-08 DIAGNOSIS — M16.11 OSTEOARTHRITIS OF RIGHT HIP, UNSPECIFIED OSTEOARTHRITIS TYPE: ICD-10-CM

## 2020-12-08 NOTE — TELEPHONE ENCOUNTER
Pt is requesting a refill on the following medication,   HYDROcodone-acetaminophen (NORCO) 7.5-325 MG per tablet        Sig: Take 1 tablet by mouth Every 6 (Six) Hours As Needed for Moderate Pain .        Please send to [facility]

## 2020-12-09 RX ORDER — HYDROCODONE BITARTRATE AND ACETAMINOPHEN 7.5; 325 MG/1; MG/1
1 TABLET ORAL EVERY 6 HOURS PRN
Qty: 90 TABLET | Refills: 0 | Status: SHIPPED | OUTPATIENT
Start: 2020-12-09 | End: 2021-01-11 | Stop reason: SDUPTHER

## 2020-12-21 ENCOUNTER — TELEPHONE (OUTPATIENT)
Dept: FAMILY MEDICINE CLINIC | Facility: CLINIC | Age: 55
End: 2020-12-21

## 2020-12-21 NOTE — TELEPHONE ENCOUNTER
Patient states he was to have a referral to a hand specialist but has not heard back about it.    He also states he was to have a cologuard sent out to him but one has not been received.    Patient can be reached at [phone].

## 2021-01-08 ENCOUNTER — TELEMEDICINE (OUTPATIENT)
Dept: FAMILY MEDICINE CLINIC | Facility: CLINIC | Age: 56
End: 2021-01-08

## 2021-01-08 DIAGNOSIS — M16.11 PRIMARY OSTEOARTHRITIS OF RIGHT HIP: Primary | ICD-10-CM

## 2021-01-08 DIAGNOSIS — Z79.899 HIGH RISK MEDICATION USE: ICD-10-CM

## 2021-01-08 PROCEDURE — 99212 OFFICE O/P EST SF 10 MIN: CPT | Performed by: FAMILY MEDICINE

## 2021-01-08 RX ORDER — DICLOFENAC POTASSIUM 50 MG/1
TABLET, FILM COATED ORAL
Qty: 90 TABLET | Refills: 1 | Status: SHIPPED | OUTPATIENT
Start: 2021-01-08 | End: 2022-04-25

## 2021-01-08 RX ORDER — OMEPRAZOLE 20 MG/1
20 CAPSULE, DELAYED RELEASE ORAL DAILY
Qty: 30 CAPSULE | Refills: 11 | Status: SHIPPED | OUTPATIENT
Start: 2021-01-08 | End: 2022-03-31 | Stop reason: SDUPTHER

## 2021-01-08 NOTE — PROGRESS NOTES
Subjective   Navneet Roach is a 55 y.o. male.   Consent given    Time 20 min    [facility]    CC: arthritis follow up    History of Present Illness     Follow up on arthritis, meds refilled  The following portions of the patient's history were reviewed and updated as appropriate: allergies, current medications, past family history, past medical history, past social history, past surgical history and problem list.    Past Medical History:   Diagnosis Date   • Abnormal ECG    • Acute bronchitis    • Arthritis    • Blood in stool    • Constipation    • Coronary artery disease    • Cough    • DDD (degenerative disc disease), cervical    • Dyspnea    • Edema    • Fatigue    • Hemorrhoids    • Hip pain    • History of bronchitis     INHALER PRN   • History of echocardiogram    • History of kidney stones 06/2016   • History of long-term treatment with high-risk medication    • Hyperlipidemia    • Hypertension    • Insomnia    • Leg swelling     TAKES DIURETICS PRN   • Limb pain    • Limited joint range of motion (ROM)     NECK   • Low back pain    • Morbid obesity (CMS/HCC)    • Muscle spasm    • Neck pain    • Obesity    • Osteoarthritis    • Osteoarthritis of hips, bilateral    • Osteoarthritis of right hip    • Pain in right hip    • Peripheral neuropathy    • Rectal abscess    • Rectal pain    • Screening     STOP BANG   • Valvular heart disease        Past Surgical History:   Procedure Laterality Date   • CARDIAC CATHETERIZATION      DR KIM   • CERVICAL DISCECTOMY ANTERIOR      POSTERIOR   • TOTAL HIP ARTHROPLASTY Right 8/19/2016    Procedure: RT TOTAL HIP ARTHROPLASTY ANTERIOR WITH HANA TABLE;  Surgeon: Joe Gore MD;  Location: [facility] OR;  Service:    • TOTAL HIP ARTHROPLASTY Left 11/4/2016    Procedure: LT TOTAL HIP ARTHROPLASTY;  Surgeon: Joe Groe MD;  Location: [facility] OR;  Service:        Family History   Problem Relation Age of Onset   • Hypertension Mother    • Hypertension  Father    • Hyperlipidemia Father    • Hypertension Sister    • Hypertension Brother        Social History     Socioeconomic History   • Marital status:      Spouse name: Not on file   • Number of children: Not on file   • Years of education: Not on file   • Highest education level: Not on file   Tobacco Use   • Smoking status: Current Every Day Smoker     Packs/day: 0.25     Years: 28.00     Pack years: 7.00     Types: Cigarettes   • Smokeless tobacco: Never Used   Substance and Sexual Activity   • Alcohol use: No     Comment: OCCASIONAL WEEKLY OR MONTHLY   • Drug use: Yes   • Sexual activity: Defer       Current Outpatient Medications on File Prior to Visit   Medication Sig Dispense Refill   • albuterol sulfate HFA (PROVENTIL HFA) 108 (90 Base) MCG/ACT inhaler Inhale 2 puffs 4 (Four) Times a Day. 1 inhaler 6   • amLODIPine (NORVASC) 10 MG tablet Take 1 tablet by mouth Daily. 90 tablet 1   • aspirin 325 MG tablet Take 325 mg by mouth Daily. Pt stopped preop, hasnt had since 9/28/16     • cyclobenzaprine (FLEXERIL) 10 MG tablet Take 1 tablet by mouth 3 (Three) Times a Day As Needed for muscle spasms. 56 tablet 0   • hydroCHLOROthiazide (HYDRODIURIL) 25 MG tablet Take 1 tablet by mouth Daily. 90 tablet 1   • HYDROcodone-acetaminophen (NORCO) 7.5-325 MG per tablet Take 1 tablet by mouth Every 6 (Six) Hours As Needed for Moderate Pain . 90 tablet 0   • metoprolol succinate XL (TOPROL-XL) 50 MG 24 hr tablet Take 1 tablet by mouth Daily. 90 tablet 1   • nebivolol (BYSTOLIC) 10 MG tablet Take 10 mg by mouth Daily.     • olmesartan (BENICAR) 40 MG tablet Take 1 tablet by mouth Daily. 90 tablet 3   • potassium chloride (K-DUR,KLOR-CON) 20 MEQ CR tablet Take 1 tablet by mouth Daily. Pt not taking bid as rx'ed. Takes one daily if needed for body cramps 30 tablet 11   • rosuvastatin (CRESTOR) 40 MG tablet Take 1 tablet by mouth Daily. 30 tablet 6   • torsemide (DEMADEX) 20 MG tablet Take 20 mg by mouth 2 (Two) Times a  Day As Needed. Prn lower leg swelling; confirm dosage; pt not taking regularly     • [DISCONTINUED] diclofenac (CATAFLAM) 50 MG tablet Take 1 tablet by mouth three times daily. 90 tablet 1   • [DISCONTINUED] diclofenac sodium (VOTAREN XR) 100 MG 24 hr tablet Take 100 mg by mouth Daily.     • [DISCONTINUED] methylPREDNISolone (MEDROL) 4 MG dose pack Take as directed on package instructions. 21 each 0     No current facility-administered medications on file prior to visit.        Review of Systems    Recent Results (from the past 4704 hour(s))   Comprehensive Metabolic Panel    Collection Time: 09/01/20 11:46 AM    Specimen: Blood   Result Value Ref Range    Glucose 98 65 - 99 mg/dL    BUN 14 6 - 20 mg/dL    Creatinine 1.11 0.76 - 1.27 mg/dL    eGFR Non African Am 70 >60 mL/min/1.73    eGFR African Am 84 >60 mL/min/1.73    BUN/Creatinine Ratio 12.6 7.0 - 25.0    Sodium 140 136 - 145 mmol/L    Potassium 4.1 3.5 - 5.2 mmol/L    Chloride 103 98 - 107 mmol/L    Total CO2 28.2 22.0 - 29.0 mmol/L    Calcium 10.1 8.6 - 10.5 mg/dL    Total Protein 7.4 6.0 - 8.5 g/dL    Albumin 4.30 3.50 - 5.20 g/dL    Globulin 3.1 gm/dL    A/G Ratio 1.4 g/dL    Total Bilirubin 0.2 0.0 - 1.2 mg/dL    Alkaline Phosphatase 77 39 - 117 U/L    AST (SGOT) 16 1 - 40 U/L    ALT (SGPT) 23 1 - 41 U/L   Lipid Panel    Collection Time: 09/01/20 11:46 AM    Specimen: Blood   Result Value Ref Range    Total Cholesterol 226 (H) 0 - 200 mg/dL    Triglycerides 196 (H) 0 - 150 mg/dL    HDL Cholesterol 38 (L) 40 - 60 mg/dL    VLDL Cholesterol Alexander 39.2 5 - 40 mg/dL    LDL Chol Calc (NIH) 149 (H) 0 - 100 mg/dL   CBC & Differential    Collection Time: 09/01/20 11:46 AM    Specimen: Blood   Result Value Ref Range    WBC 6.01 3.40 - 10.80 10*3/mm3    RBC 5.18 4.14 - 5.80 10*6/mm3    Hemoglobin 16.3 13.0 - 17.7 g/dL    Hematocrit 48.4 37.5 - 51.0 %    MCV 93.4 79.0 - 97.0 fL    MCH 31.5 26.6 - 33.0 pg    MCHC 33.7 31.5 - 35.7 g/dL    RDW 12.9 12.3 - 15.4 %     Platelets 210 140 - 450 10*3/mm3    Neutrophil Rel % 52.6 42.7 - 76.0 %    Lymphocyte Rel % 35.8 19.6 - 45.3 %    Monocyte Rel % 7.3 5.0 - 12.0 %    Eosinophil Rel % 3.3 0.3 - 6.2 %    Basophil Rel % 0.7 0.0 - 1.5 %    Neutrophils Absolute 3.16 1.70 - 7.00 10*3/mm3    Lymphocytes Absolute 2.15 0.70 - 3.10 10*3/mm3    Monocytes Absolute 0.44 0.10 - 0.90 10*3/mm3    Eosinophils Absolute 0.20 0.00 - 0.40 10*3/mm3    Basophils Absolute 0.04 0.00 - 0.20 10*3/mm3    Immature Granulocyte Rel % 0.3 0.0 - 0.5 %    Immature Grans Absolute 0.02 0.00 - 0.05 10*3/mm3    nRBC 0.2 0.0 - 0.2 /100 WBC   Uric Acid    Collection Time: 10/22/20 11:35 AM    Specimen: Blood   Result Value Ref Range    Uric Acid 5.9 3.4 - 7.0 mg/dL     Objective   There were no vitals filed for this visit.  There is no height or weight on file to calculate BMI.  Physical Exam  Constitutional:       Appearance: He is obese.   Neurological:      Mental Status: He is alert.           Diagnoses and all orders for this visit:    1. Primary osteoarthritis of right hip (Primary)  -     diclofenac (CATAFLAM) 50 MG tablet; Take 1 tablet by mouth three times daily.  Dispense: 90 tablet; Refill: 1    2. High risk medication use  -     omeprazole (PrilOSEC) 20 MG capsule; Take 1 capsule by mouth Daily.  Dispense: 30 capsule; Refill: 11    Return in about 3 months (around 4/8/2021).

## 2021-01-11 DIAGNOSIS — M16.11 OSTEOARTHRITIS OF RIGHT HIP, UNSPECIFIED OSTEOARTHRITIS TYPE: ICD-10-CM

## 2021-01-11 RX ORDER — HYDROCODONE BITARTRATE AND ACETAMINOPHEN 7.5; 325 MG/1; MG/1
1 TABLET ORAL EVERY 6 HOURS PRN
Qty: 90 TABLET | Refills: 0 | Status: SHIPPED | OUTPATIENT
Start: 2021-01-11 | End: 2021-02-10 | Stop reason: SDUPTHER

## 2021-01-11 NOTE — TELEPHONE ENCOUNTER
Caller: Navneet Roach    Relationship: Self    Best call back number: [phone]    Medication needed:   Requested Prescriptions     Pending Prescriptions Disp Refills   • HYDROcodone-acetaminophen (NORCO) 7.5-325 MG per tablet 90 tablet 0     Sig: Take 1 tablet by mouth Every 6 (Six) Hours As Needed for Moderate Pain .       When do you need the refill by: ASAP     Does the patient have less than a 3 day supply:  [x] Yes  [] No    What is the patient's preferred pharmacy: [id number] - [place] [id number] AT [facility] OF [facility] & [facility] - [phone] [facility] [phone] FX            [phone]

## 2021-02-10 DIAGNOSIS — M16.11 OSTEOARTHRITIS OF RIGHT HIP, UNSPECIFIED OSTEOARTHRITIS TYPE: ICD-10-CM

## 2021-02-10 NOTE — TELEPHONE ENCOUNTER
Caller: Navneet Roach    Relationship: Self    Best call back number: [phone]    Medication needed:   Requested Prescriptions     Pending Prescriptions Disp Refills   • HYDROcodone-acetaminophen (NORCO) 7.5-325 MG per tablet 90 tablet 0     Sig: Take 1 tablet by mouth Every 6 (Six) Hours As Needed for Moderate Pain .     Does the patient have less than a 3 day supply:  [x] Yes  [] No    What is the patient's preferred pharmacy: [facility] #[id number] [place] [address] AT [facility] OF [facility] & [address] - [phone] [facility] [phone] FX

## 2021-02-11 RX ORDER — HYDROCODONE BITARTRATE AND ACETAMINOPHEN 7.5; 325 MG/1; MG/1
1 TABLET ORAL EVERY 6 HOURS PRN
Qty: 90 TABLET | Refills: 0 | Status: SHIPPED | OUTPATIENT
Start: 2021-02-11 | End: 2021-03-11 | Stop reason: SDUPTHER

## 2021-02-17 ENCOUNTER — TELEPHONE (OUTPATIENT)
Dept: FAMILY MEDICINE CLINIC | Facility: CLINIC | Age: 56
End: 2021-02-17

## 2021-02-17 NOTE — TELEPHONE ENCOUNTER
Caller: Navneet Hayward    Relationship: Self    Best call back number: [phone]       What orders are you requesting (i.e. lab or imaging):COLOGUARD    In what timeframe would the patient need to come in: January OR December     Where will you receive your lab/imaging services:OFFICE      Additional notes:      MR. HAYWARD SAYS THAT HE DID NOT RECEIVE A COLAGUARD TEST IN THE MAIL IN December 2020 OR January 2021, HE SAYS IT WAS TO BE ORDERED BY THE OFFICE       PLEASE ADVISE

## 2021-02-18 DIAGNOSIS — Z12.11 COLON CANCER SCREENING: Primary | ICD-10-CM

## 2021-03-10 ENCOUNTER — TELEPHONE (OUTPATIENT)
Dept: FAMILY MEDICINE CLINIC | Facility: CLINIC | Age: 56
End: 2021-03-10

## 2021-03-10 DIAGNOSIS — M16.11 OSTEOARTHRITIS OF RIGHT HIP, UNSPECIFIED OSTEOARTHRITIS TYPE: ICD-10-CM

## 2021-03-10 NOTE — TELEPHONE ENCOUNTER
Patient has been trying since July to have his Cologuard redone & has yet to receive the test box, please advise.

## 2021-03-11 RX ORDER — HYDROCODONE BITARTRATE AND ACETAMINOPHEN 7.5; 325 MG/1; MG/1
1 TABLET ORAL EVERY 6 HOURS PRN
Qty: 90 TABLET | Refills: 0 | Status: SHIPPED | OUTPATIENT
Start: 2021-03-11 | End: 2021-03-12 | Stop reason: SDUPTHER

## 2021-03-12 DIAGNOSIS — M16.11 OSTEOARTHRITIS OF RIGHT HIP, UNSPECIFIED OSTEOARTHRITIS TYPE: ICD-10-CM

## 2021-03-12 RX ORDER — HYDROCODONE BITARTRATE AND ACETAMINOPHEN 7.5; 325 MG/1; MG/1
1 TABLET ORAL EVERY 6 HOURS PRN
Qty: 90 TABLET | Refills: 0 | Status: SHIPPED | OUTPATIENT
Start: 2021-03-12 | End: 2021-04-09 | Stop reason: SDUPTHER

## 2021-03-12 RX ORDER — AMLODIPINE BESYLATE 10 MG/1
10 TABLET ORAL DAILY
Qty: 90 TABLET | Refills: 1 | Status: SHIPPED | OUTPATIENT
Start: 2021-03-12 | End: 2022-03-31 | Stop reason: SDUPTHER

## 2021-03-12 RX ORDER — HYDROCHLOROTHIAZIDE 25 MG/1
25 TABLET ORAL DAILY
Qty: 90 TABLET | Refills: 1 | Status: SHIPPED | OUTPATIENT
Start: 2021-03-12 | End: 2022-03-31 | Stop reason: SDUPTHER

## 2021-04-09 ENCOUNTER — OFFICE VISIT (OUTPATIENT)
Dept: FAMILY MEDICINE CLINIC | Facility: CLINIC | Age: 56
End: 2021-04-09

## 2021-04-09 ENCOUNTER — TELEPHONE (OUTPATIENT)
Dept: FAMILY MEDICINE CLINIC | Facility: CLINIC | Age: 56
End: 2021-04-09

## 2021-04-09 VITALS
BODY MASS INDEX: 38.45 KG/M2 | HEART RATE: 68 BPM | SYSTOLIC BLOOD PRESSURE: 149 MMHG | HEIGHT: 67 IN | WEIGHT: 245 LBS | DIASTOLIC BLOOD PRESSURE: 83 MMHG | TEMPERATURE: 97.3 F | OXYGEN SATURATION: 97 %

## 2021-04-09 DIAGNOSIS — G89.29 CHRONIC NECK PAIN: ICD-10-CM

## 2021-04-09 DIAGNOSIS — M54.2 CHRONIC NECK PAIN: ICD-10-CM

## 2021-04-09 DIAGNOSIS — R52 ENCOUNTER FOR PAIN MANAGEMENT: ICD-10-CM

## 2021-04-09 DIAGNOSIS — I10 ESSENTIAL HYPERTENSION: Primary | ICD-10-CM

## 2021-04-09 DIAGNOSIS — J45.20 MILD INTERMITTENT ASTHMA WITHOUT COMPLICATION: ICD-10-CM

## 2021-04-09 DIAGNOSIS — M16.11 OSTEOARTHRITIS OF RIGHT HIP, UNSPECIFIED OSTEOARTHRITIS TYPE: ICD-10-CM

## 2021-04-09 DIAGNOSIS — E78.2 MIXED HYPERLIPIDEMIA: ICD-10-CM

## 2021-04-09 DIAGNOSIS — L72.0 EPIDERMAL CYST OF FACE: ICD-10-CM

## 2021-04-09 DIAGNOSIS — G89.4 CHRONIC PAIN SYNDROME: ICD-10-CM

## 2021-04-09 PROCEDURE — 99214 OFFICE O/P EST MOD 30 MIN: CPT | Performed by: FAMILY MEDICINE

## 2021-04-09 RX ORDER — POTASSIUM CHLORIDE 20 MEQ/1
20 TABLET, EXTENDED RELEASE ORAL DAILY
Qty: 30 TABLET | Refills: 11 | Status: SHIPPED | OUTPATIENT
Start: 2021-04-09 | End: 2022-03-31 | Stop reason: SDUPTHER

## 2021-04-09 RX ORDER — HYDROCODONE BITARTRATE AND ACETAMINOPHEN 7.5; 325 MG/1; MG/1
1 TABLET ORAL 2 TIMES DAILY
Qty: 60 TABLET | Refills: 0 | Status: SHIPPED | OUTPATIENT
Start: 2021-04-09 | End: 2021-04-12

## 2021-04-09 RX ORDER — ROSUVASTATIN CALCIUM 40 MG/1
40 TABLET, COATED ORAL DAILY
Qty: 30 TABLET | Refills: 6 | Status: SHIPPED | OUTPATIENT
Start: 2021-04-09 | End: 2022-03-31 | Stop reason: SDUPTHER

## 2021-04-09 RX ORDER — GABAPENTIN 300 MG/1
300 CAPSULE ORAL
Qty: 30 CAPSULE | Refills: 2 | Status: SHIPPED | OUTPATIENT
Start: 2021-04-09 | End: 2022-03-31 | Stop reason: SDUPTHER

## 2021-04-09 RX ORDER — METOPROLOL SUCCINATE 50 MG/1
50 TABLET, EXTENDED RELEASE ORAL DAILY
Qty: 90 TABLET | Refills: 3 | Status: SHIPPED | OUTPATIENT
Start: 2021-04-09 | End: 2022-03-31 | Stop reason: SDUPTHER

## 2021-04-09 RX ORDER — ALBUTEROL SULFATE 90 UG/1
2 AEROSOL, METERED RESPIRATORY (INHALATION)
Qty: 18 G | Refills: 3 | Status: SHIPPED | OUTPATIENT
Start: 2021-04-09 | End: 2022-03-31 | Stop reason: SDUPTHER

## 2021-04-09 RX ORDER — OLMESARTAN MEDOXOMIL 40 MG/1
40 TABLET ORAL DAILY
Qty: 90 TABLET | Refills: 3 | Status: SHIPPED | OUTPATIENT
Start: 2021-04-09 | End: 2022-03-31 | Stop reason: SDUPTHER

## 2021-04-09 NOTE — TELEPHONE ENCOUNTER
Ok for hub to relay:    Patient hydrocodone was cancel due to failure of leaving urine. We need urine at the time of appointment for drug screen. Will not refill until patient can come in and complete drug compliance.

## 2021-04-09 NOTE — PROGRESS NOTES
Subjective   Navneet Roach is a 55 y.o. male.     Chief Complaint   Patient presents with   • Follow-up   • Hypertension   • abdullahi by left eye       History of Present Illness     Follow-up on hypertension.  Borderline control today in the office.  Patient reports that that home is much better staying stable and controlled.  Complaining on left sided facial cyst new diagnosis.  Follow-up on hyperlipidemia.  Follow-up on coronary artery disease.  Follow-up on chronic pain syndrome, chronic bilateral hip pain per, chronic chronic neck pain, pain management today  The following portions of the patient's history were reviewed and updated as appropriate: allergies, current medications, past family history, past medical history, past social history, past surgical history and problem list.    Past Medical History:   Diagnosis Date   • Abnormal ECG    • Acute bronchitis    • Arthritis    • Blood in stool    • Constipation    • Coronary artery disease    • Cough    • DDD (degenerative disc disease), cervical    • Dyspnea    • Edema    • Fatigue    • Hemorrhoids    • Hip pain    • History of bronchitis     INHALER PRN   • History of echocardiogram    • History of kidney stones 06/2016   • History of long-term treatment with high-risk medication    • Hyperlipidemia    • Hypertension    • Insomnia    • Leg swelling     TAKES DIURETICS PRN   • Limb pain    • Limited joint range of motion (ROM)     NECK   • Low back pain    • Morbid obesity (CMS/HCC)    • Muscle spasm    • Neck pain    • Obesity    • Osteoarthritis    • Osteoarthritis of hips, bilateral    • Osteoarthritis of right hip    • Pain in right hip    • Peripheral neuropathy    • Rectal abscess    • Rectal pain    • Screening     STOP BANG   • Valvular heart disease        Past Surgical History:   Procedure Laterality Date   • CARDIAC CATHETERIZATION      DR KIM   • CERVICAL DISCECTOMY ANTERIOR      POSTERIOR   • TOTAL HIP ARTHROPLASTY Right 8/19/2016     Procedure: RT TOTAL HIP ARTHROPLASTY ANTERIOR WITH HANA TABLE;  Surgeon: Joe Gore MD;  Location: [facility] OR;  Service:    • TOTAL HIP ARTHROPLASTY Left 11/4/2016    Procedure: LT TOTAL HIP ARTHROPLASTY;  Surgeon: Joe Gore MD;  Location: [facility] OR;  Service:        Family History   Problem Relation Age of Onset   • Hypertension Mother    • Hypertension Father    • Hyperlipidemia Father    • Hypertension Sister    • Hypertension Brother        Social History     Socioeconomic History   • Marital status:      Spouse name: Not on file   • Number of children: Not on file   • Years of education: Not on file   • Highest education level: Not on file   Tobacco Use   • Smoking status: Current Every Day Smoker     Packs/day: 0.25     Years: 28.00     Pack years: 7.00     Types: Cigarettes   • Smokeless tobacco: Never Used   Substance and Sexual Activity   • Alcohol use: No     Comment: OCCASIONAL WEEKLY OR MONTHLY   • Drug use: Yes   • Sexual activity: Defer       Current Outpatient Medications on File Prior to Visit   Medication Sig Dispense Refill   • amLODIPine (NORVASC) 10 MG tablet Take 1 tablet by mouth Daily. 90 tablet 1   • aspirin 325 MG tablet Take 325 mg by mouth Daily. Pt stopped preop, hasnt had since 9/28/16     • cyclobenzaprine (FLEXERIL) 10 MG tablet Take 1 tablet by mouth 3 (Three) Times a Day As Needed for muscle spasms. 56 tablet 0   • diclofenac (CATAFLAM) 50 MG tablet Take 1 tablet by mouth three times daily. 90 tablet 1   • hydroCHLOROthiazide (HYDRODIURIL) 25 MG tablet Take 1 tablet by mouth Daily. 90 tablet 1   • omeprazole (PrilOSEC) 20 MG capsule Take 1 capsule by mouth Daily. 30 capsule 11   • torsemide (DEMADEX) 20 MG tablet Take 20 mg by mouth 2 (Two) Times a Day As Needed. Prn lower leg swelling; confirm dosage; pt not taking regularly       No current facility-administered medications on file prior to visit.       Review of Systems   Musculoskeletal: Positive for  arthralgias and back pain.   Skin:        New skin lesion by the left eye       Recent Results (from the past 4704 hour(s))   Uric Acid    Collection Time: 10/22/20 11:35 AM    Specimen: Blood   Result Value Ref Range    Uric Acid 5.9 3.4 - 7.0 mg/dL   Comprehensive Metabolic Panel    Collection Time: 04/09/21  2:42 PM    Specimen: Blood   Result Value Ref Range    Glucose 71 65 - 99 mg/dL    BUN 14 6 - 20 mg/dL    Creatinine 1.32 (H) 0.76 - 1.27 mg/dL    eGFR Non African Am 57 (L) >60 mL/min/1.73    eGFR African Am 69 >60 mL/min/1.73    BUN/Creatinine Ratio 10.6 7.0 - 25.0    Sodium 143 136 - 145 mmol/L    Potassium 4.2 3.5 - 5.2 mmol/L    Chloride 102 98 - 107 mmol/L    Total CO2 32.1 (H) 22.0 - 29.0 mmol/L    Calcium 9.8 8.6 - 10.5 mg/dL    Total Protein 7.1 6.0 - 8.5 g/dL    Albumin 4.60 3.50 - 5.20 g/dL    Globulin 2.5 gm/dL    A/G Ratio 1.8 g/dL    Total Bilirubin 0.3 0.0 - 1.2 mg/dL    Alkaline Phosphatase 70 39 - 117 U/L    AST (SGOT) 18 1 - 40 U/L    ALT (SGPT) 17 1 - 41 U/L   Lipid Panel    Collection Time: 04/09/21  2:42 PM    Specimen: Blood   Result Value Ref Range    Total Cholesterol 213 (H) 0 - 200 mg/dL    Triglycerides 89 0 - 150 mg/dL    HDL Cholesterol 35 (L) 40 - 60 mg/dL    VLDL Cholesterol Alexander 16 5 - 40 mg/dL    LDL Chol Calc (NIH) 162 (H) 0 - 100 mg/dL   CBC & Differential    Collection Time: 04/09/21  2:42 PM    Specimen: Blood   Result Value Ref Range    WBC 5.83 3.40 - 10.80 10*3/mm3    RBC 5.28 4.14 - 5.80 10*6/mm3    Hemoglobin 16.9 13.0 - 17.7 g/dL    Hematocrit 48.2 37.5 - 51.0 %    MCV 91.3 79.0 - 97.0 fL    MCH 32.0 26.6 - 33.0 pg    MCHC 35.1 31.5 - 35.7 g/dL    RDW 12.5 12.3 - 15.4 %    Platelets 208 140 - 450 10*3/mm3    Neutrophil Rel % 48.7 42.7 - 76.0 %    Lymphocyte Rel % 36.7 19.6 - 45.3 %    Monocyte Rel % 9.6 5.0 - 12.0 %    Eosinophil Rel % 3.8 0.3 - 6.2 %    Basophil Rel % 0.9 0.0 - 1.5 %    Neutrophils Absolute 2.84 1.70 - 7.00 10*3/mm3    Lymphocytes Absolute 2.14 0.70  "- 3.10 10*3/mm3    Monocytes Absolute 0.56 0.10 - 0.90 10*3/mm3    Eosinophils Absolute 0.22 0.00 - 0.40 10*3/mm3    Basophils Absolute 0.05 0.00 - 0.20 10*3/mm3    Immature Granulocyte Rel % 0.3 0.0 - 0.5 %    Immature Grans Absolute 0.02 0.00 - 0.05 10*3/mm3    nRBC 0.0 0.0 - 0.2 /100 WBC   Unable To Void    Collection Time: 04/09/21  2:42 PM   Result Value Ref Range    Unable to Void Comment      Objective   Vitals:    04/09/21 1048   BP: 149/83   Pulse: 68   Temp: 97.3 °F (36.3 °C)   SpO2: 97%   Weight: 111 kg (245 lb)   Height: 170.2 cm (67.01\")     Body mass index is 38.36 kg/m².  Physical Exam  Vitals and nursing note reviewed.   Constitutional:       General: He is not in acute distress.     Appearance: He is well-developed. He is not diaphoretic.   Cardiovascular:      Rate and Rhythm: Normal rate and regular rhythm.   Pulmonary:      Effort: Pulmonary effort is normal. No respiratory distress.      Breath sounds: Normal breath sounds. No wheezing.   Skin:     Comments: Epidermal cyst by the corner of the left eye and the left side of the face           Diagnoses and all orders for this visit:    1. Essential hypertension (Primary)  Comments:  With a line control.  However well controlled at home.  We will not change any medications.  Detail discussed with patient all his medications for blood pressur  Orders:  -     metoprolol succinate XL (TOPROL-XL) 50 MG 24 hr tablet; Take 1 tablet by mouth Daily.  Dispense: 90 tablet; Refill: 3  -     olmesartan (BENICAR) 40 MG tablet; Take 1 tablet by mouth Daily.  Dispense: 90 tablet; Refill: 3  -     potassium chloride (K-DUR,KLOR-CON) 20 MEQ CR tablet; Take 1 tablet by mouth Daily. Pt not taking bid as rx'ed. Takes one daily if needed for body cramps  Dispense: 30 tablet; Refill: 11  -     Comprehensive Metabolic Panel  -     Lipid Panel  -     CBC & Differential    2. Mixed hyperlipidemia  Comments:  Stable.  Continue current medications.  Labs today  Orders:  -   "   rosuvastatin (CRESTOR) 40 MG tablet; Take 1 tablet by mouth Daily.  Dispense: 30 tablet; Refill: 6  -     Comprehensive Metabolic Panel  -     Lipid Panel    3. Mild intermittent asthma without complication  Comments:  Stable on current medications.  Refills today  Orders:  -     albuterol sulfate HFA (Proventil HFA) 108 (90 Base) MCG/ACT inhaler; Inhale 2 puffs 4 (Four) Times a Day.  Dispense: 18 g; Refill: 3    4. Osteoarthritis of right hip, unspecified osteoarthritis type  Comments:  Chronic ongoing bilateral hip pain.  On pain management.  Will however slowly wean off the hydrocodone today was only refilled #60 from 3 a day to 2 a day  Orders:  -     Discontinue: HYDROcodone-acetaminophen (NORCO) 7.5-325 MG per tablet; Take 1 tablet by mouth 2 (two) times a day.  Dispense: 60 tablet; Refill: 0  -     gabapentin (Neurontin) 300 MG capsule; Take 1 capsule by mouth every night at bedtime.  Dispense: 30 capsule; Refill: 2  -     Compliance Drug Analysis, Ur - Urine, Clean Catch  -     Comprehensive Metabolic Panel    5. Encounter for pain management  Comments:  Will recommend gabapentin 300 mg p.o. nightly for the pain today.  We will wean hydrocodone from #90 #60 to take only twice a day.    Orders:  -     Compliance Drug Analysis, Ur - Urine, Clean Catch    6. Epidermal cyst of face  -     Ambulatory Referral to Plastic Surgery    7. Chronic neck pain  -     Compliance Drug Analysis, Ur - Urine, Clean Catch  -     Comprehensive Metabolic Panel    8. Chronic pain syndrome  -     Comprehensive Metabolic Panel    Other orders  -     Unable To Void    The patient has read and signed the [facility] Controlled Substance Contract.  I will continue to see patient for regular follow up appointments.  They are well controlled on their medication.  RITO is updated every 3 months. The patient is aware of the potential for addiction and dependence.  Education regarding Neurontin is given.    Return in about 3 months  (around 7/9/2021) for HYPERTENSION.    Return in about 3 months (around 7/9/2021) for HYPERTENSION.    Hydrocodone was canceled, I spoke to the pharmacy and made sure they dont have it  as an active RX  Pt failked to submit Compliance urine test at the visit, left without leaving urine  Will NOT write his hydrocodone anymore.

## 2021-04-10 LAB
ALBUMIN SERPL-MCNC: 4.6 G/DL (ref 3.5–5.2)
ALBUMIN/GLOB SERPL: 1.8 G/DL
ALP SERPL-CCNC: 70 U/L (ref 39–117)
ALT SERPL-CCNC: 17 U/L (ref 1–41)
AST SERPL-CCNC: 18 U/L (ref 1–40)
BASOPHILS # BLD AUTO: 0.05 10*3/MM3 (ref 0–0.2)
BASOPHILS NFR BLD AUTO: 0.9 % (ref 0–1.5)
BILIRUB SERPL-MCNC: 0.3 MG/DL (ref 0–1.2)
BUN SERPL-MCNC: 14 MG/DL (ref 6–20)
BUN/CREAT SERPL: 10.6 (ref 7–25)
CALCIUM SERPL-MCNC: 9.8 MG/DL (ref 8.6–10.5)
CHLORIDE SERPL-SCNC: 102 MMOL/L (ref 98–107)
CHOLEST SERPL-MCNC: 213 MG/DL (ref 0–200)
CO2 SERPL-SCNC: 32.1 MMOL/L (ref 22–29)
CREAT SERPL-MCNC: 1.32 MG/DL (ref 0.76–1.27)
EOSINOPHIL # BLD AUTO: 0.22 10*3/MM3 (ref 0–0.4)
EOSINOPHIL NFR BLD AUTO: 3.8 % (ref 0.3–6.2)
ERYTHROCYTE [DISTWIDTH] IN BLOOD BY AUTOMATED COUNT: 12.5 % (ref 12.3–15.4)
GLOBULIN SER CALC-MCNC: 2.5 GM/DL
GLUCOSE SERPL-MCNC: 71 MG/DL (ref 65–99)
HCT VFR BLD AUTO: 48.2 % (ref 37.5–51)
HDLC SERPL-MCNC: 35 MG/DL (ref 40–60)
HGB BLD-MCNC: 16.9 G/DL (ref 13–17.7)
IMM GRANULOCYTES # BLD AUTO: 0.02 10*3/MM3 (ref 0–0.05)
IMM GRANULOCYTES NFR BLD AUTO: 0.3 % (ref 0–0.5)
LDLC SERPL CALC-MCNC: 162 MG/DL (ref 0–100)
LYMPHOCYTES # BLD AUTO: 2.14 10*3/MM3 (ref 0.7–3.1)
LYMPHOCYTES NFR BLD AUTO: 36.7 % (ref 19.6–45.3)
MCH RBC QN AUTO: 32 PG (ref 26.6–33)
MCHC RBC AUTO-ENTMCNC: 35.1 G/DL (ref 31.5–35.7)
MCV RBC AUTO: 91.3 FL (ref 79–97)
MONOCYTES # BLD AUTO: 0.56 10*3/MM3 (ref 0.1–0.9)
MONOCYTES NFR BLD AUTO: 9.6 % (ref 5–12)
NEUTROPHILS # BLD AUTO: 2.84 10*3/MM3 (ref 1.7–7)
NEUTROPHILS NFR BLD AUTO: 48.7 % (ref 42.7–76)
NRBC BLD AUTO-RTO: 0 /100 WBC (ref 0–0.2)
PLATELET # BLD AUTO: 208 10*3/MM3 (ref 140–450)
POTASSIUM SERPL-SCNC: 4.2 MMOL/L (ref 3.5–5.2)
PROT SERPL-MCNC: 7.1 G/DL (ref 6–8.5)
RBC # BLD AUTO: 5.28 10*6/MM3 (ref 4.14–5.8)
SODIUM SERPL-SCNC: 143 MMOL/L (ref 136–145)
TRIGL SERPL-MCNC: 89 MG/DL (ref 0–150)
UNABLE TO VOID: NORMAL
VLDLC SERPL CALC-MCNC: 16 MG/DL (ref 5–40)
WBC # BLD AUTO: 5.83 10*3/MM3 (ref 3.4–10.8)

## 2021-04-13 ENCOUNTER — TELEPHONE (OUTPATIENT)
Dept: FAMILY MEDICINE CLINIC | Facility: CLINIC | Age: 56
End: 2021-04-13

## 2021-04-13 NOTE — TELEPHONE ENCOUNTER
Caller: Navneet Roach    Relationship: Self    Best call back number: [phone]    What is the best time to reach you: ANY    Who are you requesting to speak with (clinical staff, provider,  specific staff member): DR. MCMILLAN    What was the call regarding: REGARDING HIS URINE TEST AND A REFILL FOR HIS PAIN MEDICATION.  HE WOULD LIKE A THE OPPORTUNITY TO SPEAK WITH DR. MCMILLAN TO GIVE HER A  CLEAR UNDERSTANDING OF WHAT HAPPENED.  PATIENT CALLED BACK ON Friday TO LET THEM KNOW THAT HE COULDN'T COME BACK, BUT THE OFFICE WAS CLOSED.  PATIENT DIDN'T KNOW THAT THE OFFICE CLOSES EARLY ON Friday? PLEASE CALL PATIENT.    Do you require a callback: YES

## 2021-04-13 NOTE — TELEPHONE ENCOUNTER
I CALLED PT AND EXPLAINED THAT HE FAILED TO SUBMIT COMPLIANCE URINE  TEST, THEREFORE VIOLATING PAIN MANAGEMENT AGREEMENT, I CAN NOT CONTINUE PRESCRIBING HIS PAIN MANAGEMENT.

## 2021-05-06 ENCOUNTER — TELEPHONE (OUTPATIENT)
Dept: FAMILY MEDICINE CLINIC | Facility: CLINIC | Age: 56
End: 2021-05-06

## 2021-05-06 NOTE — TELEPHONE ENCOUNTER
Caller: Navneet Roach    Relationship: Self    Best call back number: [phone]    Who are you requesting to speak with (clinical staff, provider,  specific staff member): NURSE      What was the call regarding: PATIENT STATES THAT DR. MCMILLAN TOOK HIM OFF HIS PAIN MEDS A COUPLE OF WEEKS AGO AND HE IS NOT DOING WELL WITHOUT THEM. HE WOULD LIKE HER TO RECONSIDER. PLEASE CALL AND ADVISE.     Do you require a callback: YES    CONFIRMED PHARMACY:  [id number] - [place] [address] AT [facility] [address] [facility] - [phone]  - [phone]   [phone]

## 2021-11-29 ENCOUNTER — TELEPHONE (OUTPATIENT)
Dept: FAMILY MEDICINE CLINIC | Facility: CLINIC | Age: 56
End: 2021-11-29

## 2021-11-29 NOTE — TELEPHONE ENCOUNTER
Caller: Navneet Roach    Relationship: Self    Best call back number: [phone] (M)    What is the medical concern/diagnosis: HIP PCP     What specialty or service is being requested:     What is the provider, practice or medical service name: DR. REDDY     What is the office location:     What is the office phone number: PHONE: [phone]    Any additional details: PATIENT CALLED TO ADVISE THAT HE IS HAVING SOME PAIN IN THE LEFT HIP AND LEG. PATIENT STATES THAT IT IS HARD FOR HIM TO WALK. PATIENT STATES THAT HE HAD THE HIP REPLACEMENT DONE ABOUT 3 YEARS AGO.       THANKS

## 2021-12-27 ENCOUNTER — TELEPHONE (OUTPATIENT)
Dept: FAMILY MEDICINE CLINIC | Facility: CLINIC | Age: 56
End: 2021-12-27

## 2021-12-27 NOTE — TELEPHONE ENCOUNTER
See when you can get him in with [facility].  I will fill the med he needs if you let me know what is needed.

## 2021-12-27 NOTE — TELEPHONE ENCOUNTER
Caller: Navneet Roach    Relationship to patient: Self    Best call back number: [phone]    Chief complaint: MED REFILL, KNEE PAIN, LABS    Type of visit: OFFICE VISIT    Requested date: 12/30/2021 BETWEEN 11-1    If rescheduling, when is the original appointment:     Additional notes: PATIENT STATES HE DOES NOT DRIVE AND HAS A RIDE AVAILABLE ON Thursday BETWEEN 11 AND 1

## 2021-12-28 NOTE — TELEPHONE ENCOUNTER
Pt returned call and made an appt next with Dr. Mccurdy for his knee pain. Pt states he does not need any medication refilled at this time.

## 2022-02-06 DIAGNOSIS — M16.11 PRIMARY OSTEOARTHRITIS OF RIGHT HIP: ICD-10-CM

## 2022-02-07 RX ORDER — DICLOFENAC POTASSIUM 50 MG/1
TABLET, FILM COATED ORAL
Qty: 90 TABLET | Refills: 1 | OUTPATIENT
Start: 2022-02-07

## 2022-02-07 NOTE — TELEPHONE ENCOUNTER
Rx Refill Note  Requested Prescriptions     Pending Prescriptions Disp Refills   • diclofenac (CATAFLAM) 50 MG tablet [Pharmacy Med Name: DICLOFENAC POTASSIUM 50MG TABLETS] 90 tablet 1     Sig: TAKE 1 TABLET BY MOUTH THREE TIMES DAILY      Last office visit with prescribing clinician: 4/9/2021      Next office visit with prescribing clinician: Visit date not found            Bimal Joseph MA  02/07/22, 13:38 EST

## 2022-03-31 ENCOUNTER — OFFICE VISIT (OUTPATIENT)
Dept: FAMILY MEDICINE CLINIC | Facility: CLINIC | Age: 57
End: 2022-03-31

## 2022-03-31 VITALS
HEIGHT: 67 IN | HEART RATE: 70 BPM | DIASTOLIC BLOOD PRESSURE: 76 MMHG | BODY MASS INDEX: 40.18 KG/M2 | OXYGEN SATURATION: 90 % | TEMPERATURE: 98.2 F | SYSTOLIC BLOOD PRESSURE: 138 MMHG | WEIGHT: 256 LBS

## 2022-03-31 DIAGNOSIS — E78.2 MIXED HYPERLIPIDEMIA: ICD-10-CM

## 2022-03-31 DIAGNOSIS — I25.118 CORONARY ARTERY DISEASE OF NATIVE HEART WITH STABLE ANGINA PECTORIS, UNSPECIFIED VESSEL OR LESION TYPE: ICD-10-CM

## 2022-03-31 DIAGNOSIS — G89.29 CHRONIC PAIN OF LEFT KNEE: ICD-10-CM

## 2022-03-31 DIAGNOSIS — M16.11 PRIMARY OSTEOARTHRITIS OF RIGHT HIP: ICD-10-CM

## 2022-03-31 DIAGNOSIS — M25.531 RIGHT WRIST PAIN: ICD-10-CM

## 2022-03-31 DIAGNOSIS — M16.11 OSTEOARTHRITIS OF RIGHT HIP, UNSPECIFIED OSTEOARTHRITIS TYPE: ICD-10-CM

## 2022-03-31 DIAGNOSIS — I10 ESSENTIAL HYPERTENSION: ICD-10-CM

## 2022-03-31 DIAGNOSIS — L72.0 EPIDERMAL CYST OF FACE: ICD-10-CM

## 2022-03-31 DIAGNOSIS — F17.200 SMOKING ADDICTION: ICD-10-CM

## 2022-03-31 DIAGNOSIS — J45.20 MILD INTERMITTENT ASTHMA WITHOUT COMPLICATION: ICD-10-CM

## 2022-03-31 DIAGNOSIS — M25.562 CHRONIC PAIN OF LEFT KNEE: ICD-10-CM

## 2022-03-31 DIAGNOSIS — F51.01 PRIMARY INSOMNIA: ICD-10-CM

## 2022-03-31 DIAGNOSIS — I10 PRIMARY HYPERTENSION: ICD-10-CM

## 2022-03-31 DIAGNOSIS — Z79.899 HIGH RISK MEDICATION USE: ICD-10-CM

## 2022-03-31 DIAGNOSIS — Z00.00 MEDICARE ANNUAL WELLNESS VISIT, SUBSEQUENT: Primary | ICD-10-CM

## 2022-03-31 DIAGNOSIS — E66.01 MORBIDLY OBESE: ICD-10-CM

## 2022-03-31 PROCEDURE — 73110 X-RAY EXAM OF WRIST: CPT | Performed by: FAMILY MEDICINE

## 2022-03-31 PROCEDURE — 73560 X-RAY EXAM OF KNEE 1 OR 2: CPT | Performed by: FAMILY MEDICINE

## 2022-03-31 PROCEDURE — G0439 PPPS, SUBSEQ VISIT: HCPCS | Performed by: FAMILY MEDICINE

## 2022-03-31 PROCEDURE — 1170F FXNL STATUS ASSESSED: CPT | Performed by: FAMILY MEDICINE

## 2022-03-31 PROCEDURE — 99214 OFFICE O/P EST MOD 30 MIN: CPT | Performed by: FAMILY MEDICINE

## 2022-03-31 PROCEDURE — 1159F MED LIST DOCD IN RCRD: CPT | Performed by: FAMILY MEDICINE

## 2022-03-31 RX ORDER — AMLODIPINE BESYLATE 10 MG/1
10 TABLET ORAL DAILY
Qty: 90 TABLET | Refills: 11 | Status: SHIPPED | OUTPATIENT
Start: 2022-03-31

## 2022-03-31 RX ORDER — OMEPRAZOLE 20 MG/1
20 CAPSULE, DELAYED RELEASE ORAL DAILY
Qty: 30 CAPSULE | Refills: 11 | Status: SHIPPED | OUTPATIENT
Start: 2022-03-31

## 2022-03-31 RX ORDER — OLMESARTAN MEDOXOMIL 40 MG/1
40 TABLET ORAL DAILY
Qty: 90 TABLET | Refills: 3 | Status: SHIPPED | OUTPATIENT
Start: 2022-03-31

## 2022-03-31 RX ORDER — NICOTINE 21 MG/24HR
1 PATCH, TRANSDERMAL 24 HOURS TRANSDERMAL EVERY 24 HOURS
Qty: 30 EACH | Refills: 0 | Status: SHIPPED | OUTPATIENT
Start: 2022-03-31 | End: 2022-12-20

## 2022-03-31 RX ORDER — METOPROLOL SUCCINATE 50 MG/1
50 TABLET, EXTENDED RELEASE ORAL DAILY
Qty: 90 TABLET | Refills: 3 | Status: SHIPPED | OUTPATIENT
Start: 2022-03-31

## 2022-03-31 RX ORDER — TORSEMIDE 20 MG/1
20 TABLET ORAL 2 TIMES DAILY PRN
Qty: 60 TABLET | Refills: 11 | Status: SHIPPED | OUTPATIENT
Start: 2022-03-31

## 2022-03-31 RX ORDER — ROSUVASTATIN CALCIUM 40 MG/1
40 TABLET, COATED ORAL DAILY
Qty: 30 TABLET | Refills: 11 | Status: SHIPPED | OUTPATIENT
Start: 2022-03-31

## 2022-03-31 RX ORDER — GABAPENTIN 300 MG/1
300 CAPSULE ORAL
Qty: 30 CAPSULE | Refills: 2 | Status: SHIPPED | OUTPATIENT
Start: 2022-03-31

## 2022-03-31 RX ORDER — POTASSIUM CHLORIDE 20 MEQ/1
20 TABLET, EXTENDED RELEASE ORAL DAILY
Qty: 30 TABLET | Refills: 11 | Status: SHIPPED | OUTPATIENT
Start: 2022-03-31

## 2022-03-31 RX ORDER — HYDROCHLOROTHIAZIDE 25 MG/1
25 TABLET ORAL DAILY
Qty: 90 TABLET | Refills: 11 | Status: SHIPPED | OUTPATIENT
Start: 2022-03-31

## 2022-03-31 RX ORDER — ALBUTEROL SULFATE 90 UG/1
2 AEROSOL, METERED RESPIRATORY (INHALATION)
Qty: 18 G | Refills: 11 | Status: SHIPPED | OUTPATIENT
Start: 2022-03-31 | End: 2022-11-14

## 2022-03-31 NOTE — PROGRESS NOTES
The ABCs of the Annual Wellness Visit  Subsequent Medicare Wellness Visit    Chief Complaint   Patient presents with   • Knee Pain   • Wrist Pain      Subjective    History of Present Illness:  Navneet Roach is a 56 y.o. male who presents for a Subsequent Medicare Wellness Visit.  Patient is complaining on the right wrist pain and swelling for the last several months, no trauma no known etiology.  Is also complaining today on the left knee pain with swelling and restriction range of motion.  Denies any trauma or injury.  He does have quite significant osteoarthritis for which he already had bilateral hip replacements with persistent pain.    Hypertension follow-up stable.  CAD follow-up stable.  Hyperlipidemia stable.  Patient is morbidly obese and not losing much weight.    The following portions of the patient's history were reviewed and   updated as appropriate: allergies, current medications, past family history, past medical history, past social history, past surgical history and problem list.    Compared to one year ago, the patient feels his physical   health is worse.    Compared to one year ago, the patient feels his mental   health is the same.    Recent Hospitalizations:  He was not admitted to the hospital during the last year.       Current Medical Providers:  Patient Care Team:  Payal Bernardo MD as PCP - General (Family Medicine)  Amarilis Beasley MD as Consulting Physician (Cardiology)    Outpatient Medications Prior to Visit   Medication Sig Dispense Refill   • aspirin 325 MG tablet Take 325 mg by mouth Daily. Pt stopped preop, hasnt had since 9/28/16     • cyclobenzaprine (FLEXERIL) 10 MG tablet Take 1 tablet by mouth 3 (Three) Times a Day As Needed for muscle spasms. 56 tablet 0   • diclofenac (CATAFLAM) 50 MG tablet Take 1 tablet by mouth three times daily. 90 tablet 1   • albuterol sulfate HFA (Proventil HFA) 108 (90 Base) MCG/ACT inhaler Inhale 2 puffs 4 (Four) Times a Day. 18 g  3   • amLODIPine (NORVASC) 10 MG tablet Take 1 tablet by mouth Daily. 90 tablet 1   • gabapentin (Neurontin) 300 MG capsule Take 1 capsule by mouth every night at bedtime. 30 capsule 2   • hydroCHLOROthiazide (HYDRODIURIL) 25 MG tablet Take 1 tablet by mouth Daily. 90 tablet 1   • metoprolol succinate XL (TOPROL-XL) 50 MG 24 hr tablet Take 1 tablet by mouth Daily. 90 tablet 3   • olmesartan (BENICAR) 40 MG tablet Take 1 tablet by mouth Daily. 90 tablet 3   • omeprazole (PrilOSEC) 20 MG capsule Take 1 capsule by mouth Daily. 30 capsule 11   • potassium chloride (K-DUR,KLOR-CON) 20 MEQ CR tablet Take 1 tablet by mouth Daily. Pt not taking bid as rx'ed. Takes one daily if needed for body cramps 30 tablet 11   • rosuvastatin (CRESTOR) 40 MG tablet Take 1 tablet by mouth Daily. 30 tablet 6   • torsemide (DEMADEX) 20 MG tablet Take 20 mg by mouth 2 (Two) Times a Day As Needed. Prn lower leg swelling; confirm dosage; pt not taking regularly       No facility-administered medications prior to visit.       No opioid medication identified on active medication list. I have reviewed chart for other potential  high risk medication/s and harmful drug interactions in the elderly.          Aspirin is on active medication list. Aspirin use is indicated based on review of current medical condition/s. Pros and cons of this therapy have been discussed today. Benefits of this medication outweigh potential harm.  Patient has been encouraged to continue taking this medication.  .      Patient Active Problem List   Diagnosis   • Osteoarthritis of right hip   • Primary osteoarthritis of left hip   • Anemia, posthemorrhagic, acute   • DAVID (acute kidney injury) (HCC)   • CKD (chronic kidney disease)   • HTN (hypertension)   • CAD (coronary artery disease)   • Hyperlipidemia   • Medicare annual wellness visit, subsequent   • Hip pain, bilateral   • Chronic neck pain   • Status post lumbar spine operative procedure for decompression of spinal  "cord   • Morbidly obese (HCC)   • Chronic pain syndrome   • Right wrist pain     Advance Care Planning  Advance Directive is not on file.  ACP discussion was held with the patient during this visit. Patient has an advance directive (not in EMR), copy requested.    Review of Systems   Constitutional: Negative.    Musculoskeletal: Positive for arthralgias.        Objective    Vitals:    03/31/22 0953   BP: 138/76   Pulse: 70   Temp: 98.2 °F (36.8 °C)   SpO2: 90%   Weight: 116 kg (256 lb)   Height: 170.2 cm (67.01\")     BMI Readings from Last 1 Encounters:   03/31/22 40.09 kg/m²   BMI is above normal parameters. Recommendations include: exercise counseling    Does the patient have evidence of cognitive impairment? No    Physical Exam  Vitals and nursing note reviewed.   Constitutional:       General: He is not in acute distress.     Appearance: He is well-developed. He is not diaphoretic.   Cardiovascular:      Rate and Rhythm: Normal rate and regular rhythm.   Pulmonary:      Effort: Pulmonary effort is normal. No respiratory distress.      Breath sounds: Normal breath sounds. No wheezing.   Musculoskeletal:      Comments: Left knee soft tissue edema  Rt wrist soft tissue edema                 HEALTH RISK ASSESSMENT    Smoking Status:  Social History     Tobacco Use   Smoking Status Current Every Day Smoker   • Packs/day: 0.25   • Years: 28.00   • Pack years: 7.00   • Types: Cigarettes   Smokeless Tobacco Never Used     Alcohol Consumption:  Social History     Substance and Sexual Activity   Alcohol Use No    Comment: OCCASIONAL WEEKLY OR MONTHLY     Fall Risk Screen:    STEADI Fall Risk Assessment was completed, and patient is at LOW risk for falls.Assessment completed on:3/31/2022    Depression Screening:  PHQ-2/PHQ-9 Depression Screening 3/31/2022   Retired Total Score -   Little Interest or Pleasure in Doing Things 0-->not at all   Feeling Down, Depressed or Hopeless 0-->not at all   PHQ-9: Brief Depression " Severity Measure Score 0       Health Habits and Functional and Cognitive Screening:  Functional & Cognitive Status 3/31/2022   Do you have difficulty preparing food and eating? No   Do you have difficulty bathing yourself, getting dressed or grooming yourself? No   Do you have difficulty using the toilet? No   Do you have difficulty moving around from place to place? No   Do you have trouble with steps or getting out of a bed or a chair? No   Current Diet Well Balanced Diet   Dental Exam Up to date   Eye Exam Up to date   Exercise (times per week) 0 times per week   Current Exercises Include No Regular Exercise   Current Exercise Activities Include -   Do you need help using the phone?  No   Are you deaf or do you have serious difficulty hearing?  No   Do you need help with transportation? No   Do you need help shopping? No   Do you need help preparing meals?  No   Do you need help with housework?  No   Do you need help with laundry? No   Do you need help taking your medications? No   Do you need help managing money? No   Do you ever drive or ride in a car without wearing a seat belt? No   Have you felt unusual stress, anger or loneliness in the last month? No   Who do you live with? Alone   If you need help, do you have trouble finding someone available to you? No   Have you been bothered in the last four weeks by sexual problems? No   Do you have difficulty concentrating, remembering or making decisions? No       Age-appropriate Screening Schedule:  Refer to the list below for future screening recommendations based on patient's age, sex and/or medical conditions. Orders for these recommended tests are listed in the plan section. The patient has been provided with a written plan.    Health Maintenance   Topic Date Due   • ZOSTER VACCINE (1 of 2) Never done   • LIPID PANEL  04/09/2022   • INFLUENZA VACCINE  Discontinued   • TDAP/TD VACCINES  Discontinued              Assessment/Plan   CMS Preventative Services  Quick Reference  Risk Factors Identified During Encounter  Fall Risk-High or Moderate  The above risks/problems have been discussed with the patient.  Follow up actions/plans if indicated are seen below in the Assessment/Plan Section.  Pertinent information has been shared with the patient in the After Visit Summary.    Diagnoses and all orders for this visit:    1. Medicare annual wellness visit, subsequent (Primary)    2. Chronic pain of left knee  -     XR Knee 1 or 2 View Left (In Office)  -     Ambulatory Referral to Orthopedic Surgery    3. Right wrist pain  -     XR Wrist 3+ View Right (In Office)    4. Coronary artery disease of native heart with stable angina pectoris, unspecified vessel or lesion type (HCC)    5. Primary hypertension  -     amLODIPine (NORVASC) 10 MG tablet; Take 1 tablet by mouth Daily.  Dispense: 90 tablet; Refill: 11  -     hydroCHLOROthiazide (HYDRODIURIL) 25 MG tablet; Take 1 tablet by mouth Daily.  Dispense: 90 tablet; Refill: 11  -     torsemide (DEMADEX) 20 MG tablet; Take 1 tablet by mouth 2 (Two) Times a Day As Needed (edema). Prn lower leg swelling; confirm dosage; pt not taking regularly  Dispense: 60 tablet; Refill: 11  -     Comprehensive Metabolic Panel  -     Lipid Panel  -     CBC & Differential    6. Mixed hyperlipidemia  -     rosuvastatin (CRESTOR) 40 MG tablet; Take 1 tablet by mouth Daily.  Dispense: 30 tablet; Refill: 11  -     Comprehensive Metabolic Panel  -     Lipid Panel    7. Mild intermittent asthma without complication  Comments:  Stable on current medications.  Refills today  Orders:  -     albuterol sulfate HFA (Proventil HFA) 108 (90 Base) MCG/ACT inhaler; Inhale 2 puffs 4 (Four) Times a Day.  Dispense: 18 g; Refill: 11    8. Primary osteoarthritis of right hip    9. Osteoarthritis of right hip, unspecified osteoarthritis type  Comments:  Chronic ongoing bilateral hip pain.  On pain management.  Will however slowly wean off the hydrocodone today was only  refilled #60 from 3 a day to 2 a day  Orders:  -     gabapentin (Neurontin) 300 MG capsule; Take 1 capsule by mouth every night at bedtime.  Dispense: 30 capsule; Refill: 2    10. Essential hypertension  Comments:  With a line control.  However well controlled at home.  We will not change any medications.  Detail discussed with patient all his medications for blood pressur  Orders:  -     metoprolol succinate XL (TOPROL-XL) 50 MG 24 hr tablet; Take 1 tablet by mouth Daily.  Dispense: 90 tablet; Refill: 3  -     olmesartan (BENICAR) 40 MG tablet; Take 1 tablet by mouth Daily.  Dispense: 90 tablet; Refill: 3  -     potassium chloride (K-DUR,KLOR-CON) 20 MEQ CR tablet; Take 1 tablet by mouth Daily. Pt not taking bid as rx'ed. Takes one daily if needed for body cramps  Dispense: 30 tablet; Refill: 11    11. High risk medication use  -     omeprazole (PrilOSEC) 20 MG capsule; Take 1 capsule by mouth Daily.  Dispense: 30 capsule; Refill: 11    12. Mixed hyperlipidemia  Comments:  Stable.  Continue current medications.  Labs today  Orders:  -     rosuvastatin (CRESTOR) 40 MG tablet; Take 1 tablet by mouth Daily.  Dispense: 30 tablet; Refill: 11  -     Comprehensive Metabolic Panel  -     Lipid Panel    13. Epidermal cyst of face  -     Ambulatory Referral to Plastic Surgery    14. Primary insomnia    15. Morbidly obese (HCC)    16. Smoking addiction  -     nicotine (NICODERM CQ) 21 MG/24HR patch; Place 1 patch on the skin as directed by provider Daily.  Dispense: 30 each; Refill: 0  -     nicotine (Nicoderm CQ) 14 MG/24HR patch; Place 1 patch on the skin as directed by provider Daily.  Dispense: 30 each; Refill: 0  -     nicotine (NICODERM CQ) 7 MG/24HR patch; Place 1 patch on the skin as directed by provider Daily.  Dispense: 30 each; Refill: 1        Follow Up:    Return in about 3 months (around 6/30/2022).      An After Visit Summary and PPPS were made available to the patient.

## 2022-04-01 LAB
ALBUMIN SERPL-MCNC: 4.6 G/DL (ref 3.8–4.9)
ALBUMIN/GLOB SERPL: 1.7 {RATIO} (ref 1.2–2.2)
ALP SERPL-CCNC: 75 IU/L (ref 44–121)
ALT SERPL-CCNC: 18 IU/L (ref 0–44)
AST SERPL-CCNC: 14 IU/L (ref 0–40)
BASOPHILS # BLD AUTO: 0 X10E3/UL (ref 0–0.2)
BASOPHILS NFR BLD AUTO: 1 %
BILIRUB SERPL-MCNC: 0.3 MG/DL (ref 0–1.2)
BUN SERPL-MCNC: 12 MG/DL (ref 6–24)
BUN/CREAT SERPL: 11 (ref 9–20)
CALCIUM SERPL-MCNC: 9.9 MG/DL (ref 8.7–10.2)
CHLORIDE SERPL-SCNC: 102 MMOL/L (ref 96–106)
CHOLEST SERPL-MCNC: 138 MG/DL (ref 100–199)
CO2 SERPL-SCNC: 23 MMOL/L (ref 20–29)
CREAT SERPL-MCNC: 1.07 MG/DL (ref 0.76–1.27)
EGFRCR SERPLBLD CKD-EPI 2021: 82 ML/MIN/1.73
EOSINOPHIL # BLD AUTO: 0.2 X10E3/UL (ref 0–0.4)
EOSINOPHIL NFR BLD AUTO: 3 %
ERYTHROCYTE [DISTWIDTH] IN BLOOD BY AUTOMATED COUNT: 12.5 % (ref 11.6–15.4)
GLOBULIN SER CALC-MCNC: 2.7 G/DL (ref 1.5–4.5)
GLUCOSE SERPL-MCNC: 92 MG/DL (ref 65–99)
HCT VFR BLD AUTO: 49.4 % (ref 37.5–51)
HDLC SERPL-MCNC: 38 MG/DL
HGB BLD-MCNC: 17 G/DL (ref 13–17.7)
IMM GRANULOCYTES # BLD AUTO: 0 X10E3/UL (ref 0–0.1)
IMM GRANULOCYTES NFR BLD AUTO: 1 %
LDLC SERPL CALC-MCNC: 86 MG/DL (ref 0–99)
LYMPHOCYTES # BLD AUTO: 2.6 X10E3/UL (ref 0.7–3.1)
LYMPHOCYTES NFR BLD AUTO: 43 %
MCH RBC QN AUTO: 31.3 PG (ref 26.6–33)
MCHC RBC AUTO-ENTMCNC: 34.4 G/DL (ref 31.5–35.7)
MCV RBC AUTO: 91 FL (ref 79–97)
MONOCYTES # BLD AUTO: 0.5 X10E3/UL (ref 0.1–0.9)
MONOCYTES NFR BLD AUTO: 9 %
NEUTROPHILS # BLD AUTO: 2.6 X10E3/UL (ref 1.4–7)
NEUTROPHILS NFR BLD AUTO: 43 %
PLATELET # BLD AUTO: 197 X10E3/UL (ref 150–450)
POTASSIUM SERPL-SCNC: 4.6 MMOL/L (ref 3.5–5.2)
PROT SERPL-MCNC: 7.3 G/DL (ref 6–8.5)
RBC # BLD AUTO: 5.43 X10E6/UL (ref 4.14–5.8)
SODIUM SERPL-SCNC: 142 MMOL/L (ref 134–144)
TRIGL SERPL-MCNC: 70 MG/DL (ref 0–149)
VLDLC SERPL CALC-MCNC: 14 MG/DL (ref 5–40)
WBC # BLD AUTO: 6 X10E3/UL (ref 3.4–10.8)

## 2022-11-14 DIAGNOSIS — J45.20 MILD INTERMITTENT ASTHMA WITHOUT COMPLICATION: ICD-10-CM

## 2022-11-14 RX ORDER — ALBUTEROL SULFATE 90 UG/1
AEROSOL, METERED RESPIRATORY (INHALATION)
Qty: 17 G | Refills: 3 | Status: SHIPPED | OUTPATIENT
Start: 2022-11-14

## 2022-12-20 ENCOUNTER — OFFICE VISIT (OUTPATIENT)
Dept: FAMILY MEDICINE CLINIC | Facility: CLINIC | Age: 57
End: 2022-12-20

## 2022-12-20 VITALS
SYSTOLIC BLOOD PRESSURE: 136 MMHG | TEMPERATURE: 98.4 F | HEIGHT: 67 IN | BODY MASS INDEX: 40.18 KG/M2 | OXYGEN SATURATION: 91 % | HEART RATE: 66 BPM | RESPIRATION RATE: 18 BRPM | DIASTOLIC BLOOD PRESSURE: 84 MMHG | WEIGHT: 256 LBS

## 2022-12-20 DIAGNOSIS — I25.118 CORONARY ARTERY DISEASE OF NATIVE HEART WITH STABLE ANGINA PECTORIS, UNSPECIFIED VESSEL OR LESION TYPE: ICD-10-CM

## 2022-12-20 DIAGNOSIS — I10 PRIMARY HYPERTENSION: ICD-10-CM

## 2022-12-20 DIAGNOSIS — E78.2 MIXED HYPERLIPIDEMIA: ICD-10-CM

## 2022-12-20 DIAGNOSIS — E66.01 MORBIDLY OBESE: ICD-10-CM

## 2022-12-20 DIAGNOSIS — Z23 IMMUNIZATION DUE: Primary | ICD-10-CM

## 2022-12-20 DIAGNOSIS — F17.210 CIGARETTE SMOKER: ICD-10-CM

## 2022-12-20 PROCEDURE — 90677 PCV20 VACCINE IM: CPT | Performed by: FAMILY MEDICINE

## 2022-12-20 PROCEDURE — 99214 OFFICE O/P EST MOD 30 MIN: CPT | Performed by: FAMILY MEDICINE

## 2022-12-20 PROCEDURE — 0124A COVID-19 (PFIZER) BIVALENT BOOSTER 12+YRS: CPT | Performed by: FAMILY MEDICINE

## 2022-12-20 PROCEDURE — G0009 ADMIN PNEUMOCOCCAL VACCINE: HCPCS | Performed by: FAMILY MEDICINE

## 2022-12-20 PROCEDURE — 91312 COVID-19 (PFIZER) BIVALENT BOOSTER 12+YRS: CPT | Performed by: FAMILY MEDICINE

## 2022-12-20 NOTE — PROGRESS NOTES
Subjective   Navneet Roach is a 56 y.o. male.     Chief Complaint   Patient presents with   • Hypertension       History of Present Illness     Hypertension follow-up.  Initially patient's blood pressure was elevated but after resting and rechecking it at a more comfortable environment.  Patient's blood pressure is stable.  He is not taking it at home.  Hyperlipidemia, stable but patient did not have any labs for 8 months.  Due for labs today.  CAD, lost for follow-up with his cardiologist.  Need to schedule him for follow-up    Patient also is having quite significant pain with his bilateral hip and knees arthritis.  But he is a patient of orthopedic surgeon and I encouraged him to schedule an appointment with them.  The following portions of the patient's history were reviewed and updated as appropriate: allergies, current medications, past family history, past medical history, past social history, past surgical history and problem list.    Past Medical History:   Diagnosis Date   • Abnormal ECG    • Acute bronchitis    • Arthritis    • Blood in stool    • Constipation    • Coronary artery disease    • Cough    • DDD (degenerative disc disease), cervical    • Dyspnea    • Edema    • Fatigue    • Hemorrhoids    • Hip pain    • History of bronchitis     INHALER PRN   • History of echocardiogram    • History of kidney stones 06/2016   • History of long-term treatment with high-risk medication    • Hyperlipidemia    • Hypertension    • Insomnia    • Leg swelling     TAKES DIURETICS PRN   • Limb pain    • Limited joint range of motion (ROM)     NECK   • Low back pain    • Morbid obesity (HCC)    • Muscle spasm    • Neck pain    • Obesity    • Osteoarthritis    • Osteoarthritis of hips, bilateral    • Osteoarthritis of right hip    • Pain in right hip    • Peripheral neuropathy    • Rectal abscess    • Rectal pain    • Screening     STOP BANG   • Valvular heart disease        Past Surgical History:   Procedure  Laterality Date   • CARDIAC CATHETERIZATION      DR KIM   • CERVICAL DISCECTOMY ANTERIOR      POSTERIOR   • TOTAL HIP ARTHROPLASTY Right 8/19/2016    Procedure: RT TOTAL HIP ARTHROPLASTY ANTERIOR WITH HANA TABLE;  Surgeon: Joe Gore MD;  Location: [facility] MAIN OR;  Service:    • TOTAL HIP ARTHROPLASTY Left 11/4/2016    Procedure: LT TOTAL HIP ARTHROPLASTY;  Surgeon: Joe Gore MD;  Location: [facility] MAIN OR;  Service:        Family History   Problem Relation Age of Onset   • Hypertension Mother    • Hypertension Father    • Hyperlipidemia Father    • Hypertension Sister    • Hypertension Brother        Social History     Socioeconomic History   • Marital status:    Tobacco Use   • Smoking status: Every Day     Packs/day: 0.25     Years: 28.00     Pack years: 7.00     Types: Cigarettes   • Smokeless tobacco: Never   Vaping Use   • Vaping Use: Never used   Substance and Sexual Activity   • Alcohol use: No     Comment: OCCASIONAL WEEKLY OR MONTHLY   • Drug use: Yes   • Sexual activity: Defer       Current Outpatient Medications on File Prior to Visit   Medication Sig Dispense Refill   • albuterol sulfate  (90 Base) MCG/ACT inhaler INHALE 2 PUFFS FOUR TIMES DAILY 17 g 3   • amLODIPine (NORVASC) 10 MG tablet Take 1 tablet by mouth Daily. 90 tablet 11   • aspirin 325 MG tablet Take 325 mg by mouth Daily. Pt stopped preop, hasnt had since 9/28/16     • gabapentin (Neurontin) 300 MG capsule Take 1 capsule by mouth every night at bedtime. 30 capsule 2   • hydroCHLOROthiazide (HYDRODIURIL) 25 MG tablet Take 1 tablet by mouth Daily. 90 tablet 11   • metoprolol succinate XL (TOPROL-XL) 50 MG 24 hr tablet Take 1 tablet by mouth Daily. 90 tablet 3   • olmesartan (BENICAR) 40 MG tablet Take 1 tablet by mouth Daily. 90 tablet 3   • omeprazole (PrilOSEC) 20 MG capsule Take 1 capsule by mouth Daily. 30 capsule 11   • potassium chloride (K-DUR,KLOR-CON) 20 MEQ CR tablet Take 1 tablet by mouth Daily. Pt not  "taking bid as rx'ed. Takes one daily if needed for body cramps 30 tablet 11   • rosuvastatin (CRESTOR) 40 MG tablet Take 1 tablet by mouth Daily. 30 tablet 11   • torsemide (DEMADEX) 20 MG tablet Take 1 tablet by mouth 2 (Two) Times a Day As Needed (edema). Prn lower leg swelling; confirm dosage; pt not taking regularly 60 tablet 11   • [DISCONTINUED] cyclobenzaprine (FLEXERIL) 10 MG tablet Take 1 tablet by mouth 3 (Three) Times a Day As Needed for muscle spasms. 56 tablet 0   • [DISCONTINUED] nicotine (Nicoderm CQ) 14 MG/24HR patch Place 1 patch on the skin as directed by provider Daily. 30 each 0   • [DISCONTINUED] nicotine (NICODERM CQ) 21 MG/24HR patch Place 1 patch on the skin as directed by provider Daily. 30 each 0   • [DISCONTINUED] nicotine (NICODERM CQ) 7 MG/24HR patch Place 1 patch on the skin as directed by provider Daily. 30 each 1     No current facility-administered medications on file prior to visit.       Review of Systems   Constitutional: Negative.    Musculoskeletal: Positive for arthralgias and back pain.       No results found for this or any previous visit (from the past 4704 hour(s)).  Objective   Vitals:    12/20/22 1410 12/20/22 1458   BP: 164/100 136/84   BP Location: Left arm Left arm   Patient Position: Sitting Sitting   Cuff Size: Large Adult Large Adult   Pulse: 66    Resp: 18    Temp: 98.4 °F (36.9 °C)    TempSrc: Temporal    SpO2: 91%    Weight: 116 kg (256 lb)    Height: 170.2 cm (67\")      Body mass index is 40.1 kg/m².  Physical Exam  Vitals and nursing note reviewed.   Constitutional:       General: He is not in acute distress.     Appearance: He is well-developed. He is obese. He is not diaphoretic.   Cardiovascular:      Rate and Rhythm: Normal rate and regular rhythm.   Pulmonary:      Effort: Pulmonary effort is normal. No respiratory distress.      Breath sounds: Normal breath sounds. No wheezing.           Diagnoses and all orders for this visit:    1. Immunization due " (Primary)  -     COVID-19 Bivalent Booster (Pfizer) 12+yrs  -     Pneumococcal Conjugate Vaccine 20-Valent (PCV20)    2. Mixed hyperlipidemia    3. Primary hypertension  -     Comprehensive Metabolic Panel  -     Lipid Panel With LDL / HDL Ratio    4. Coronary artery disease of native heart with stable angina pectoris, unspecified vessel or lesion type (HCC)  -     Ambulatory Referral to Cardiology    5. Morbidly obese (HCC)    6. Cigarette smoker  -      CT Chest Low Dose Cancer Screening WO; Future        Return in about 2 months (around 2/20/2023) for HYPERTENSION.

## 2022-12-21 LAB
ALBUMIN SERPL-MCNC: 4.5 G/DL (ref 3.5–5.2)
ALBUMIN/GLOB SERPL: 2 G/DL
ALP SERPL-CCNC: 52 U/L (ref 39–117)
ALT SERPL-CCNC: 14 U/L (ref 1–41)
AST SERPL-CCNC: 18 U/L (ref 1–40)
BILIRUB SERPL-MCNC: 0.4 MG/DL (ref 0–1.2)
BUN SERPL-MCNC: 11 MG/DL (ref 6–20)
BUN/CREAT SERPL: 10.1 (ref 7–25)
CALCIUM SERPL-MCNC: 9.5 MG/DL (ref 8.6–10.5)
CHLORIDE SERPL-SCNC: 103 MMOL/L (ref 98–107)
CHOLEST SERPL-MCNC: 145 MG/DL (ref 0–200)
CO2 SERPL-SCNC: 31.5 MMOL/L (ref 22–29)
CREAT SERPL-MCNC: 1.09 MG/DL (ref 0.76–1.27)
EGFRCR SERPLBLD CKD-EPI 2021: 80.7 ML/MIN/1.73
GLOBULIN SER CALC-MCNC: 2.3 GM/DL
GLUCOSE SERPL-MCNC: 89 MG/DL (ref 65–99)
HDLC SERPL-MCNC: 38 MG/DL (ref 40–60)
LDLC SERPL CALC-MCNC: 88 MG/DL (ref 0–100)
LDLC/HDLC SERPL: 2.28 {RATIO}
POTASSIUM SERPL-SCNC: 4 MMOL/L (ref 3.5–5.2)
PROT SERPL-MCNC: 6.8 G/DL (ref 6–8.5)
SODIUM SERPL-SCNC: 142 MMOL/L (ref 136–145)
TRIGL SERPL-MCNC: 102 MG/DL (ref 0–150)
VLDLC SERPL CALC-MCNC: 19 MG/DL (ref 5–40)

## 2023-06-15 ENCOUNTER — TELEPHONE (OUTPATIENT)
Dept: FAMILY MEDICINE CLINIC | Facility: CLINIC | Age: 58
End: 2023-06-15
Payer: MEDICARE

## 2023-06-15 NOTE — TELEPHONE ENCOUNTER
Caller: Navneet Roach    Relationship: Self    Best call back number: [phone]     What form or medical record are you requesting: NEW PATIENT PAPERWORK     Who is requesting this form or medical record from you: SELF     How would you like to receive the form or medical records (pick-up, mail, fax): MAIL   If mail, what is the address: [address] [place] [address] [address], [place] [address]     Timeframe paperwork needed: BEFORE 8/9/23     Additional notes: PATIENT WOULD LIKE NEW PATIENT PAPERWORK TO BE MAILED TO HIM BEFORE APPOINTMENT, SO HE CAN FILL IT OUT AHEAD OF TIME.

## 2025-02-05 VITALS
RESPIRATION RATE: 25 BRPM | SYSTOLIC BLOOD PRESSURE: 130 MMHG | RESPIRATION RATE: 23 BRPM | HEART RATE: 66 BPM | SYSTOLIC BLOOD PRESSURE: 121 MMHG | OXYGEN SATURATION: 89 % | DIASTOLIC BLOOD PRESSURE: 68 MMHG | SYSTOLIC BLOOD PRESSURE: 97 MMHG | HEART RATE: 71 BPM | HEART RATE: 19 BPM | HEART RATE: 73 BPM | SYSTOLIC BLOOD PRESSURE: 79 MMHG | TEMPERATURE: 96.7 F | DIASTOLIC BLOOD PRESSURE: 63 MMHG | SYSTOLIC BLOOD PRESSURE: 85 MMHG | HEART RATE: 70 BPM | SYSTOLIC BLOOD PRESSURE: 75 MMHG | DIASTOLIC BLOOD PRESSURE: 58 MMHG | DIASTOLIC BLOOD PRESSURE: 56 MMHG | HEART RATE: 58 BPM | OXYGEN SATURATION: 92 % | OXYGEN SATURATION: 88 % | DIASTOLIC BLOOD PRESSURE: 62 MMHG | TEMPERATURE: 97.3 F | DIASTOLIC BLOOD PRESSURE: 80 MMHG | SYSTOLIC BLOOD PRESSURE: 93 MMHG | RESPIRATION RATE: 20 BRPM | HEIGHT: 67 IN | DIASTOLIC BLOOD PRESSURE: 54 MMHG | HEART RATE: 77 BPM | SYSTOLIC BLOOD PRESSURE: 160 MMHG | OXYGEN SATURATION: 90 % | OXYGEN SATURATION: 95 % | HEART RATE: 79 BPM | OXYGEN SATURATION: 91 % | RESPIRATION RATE: 16 BRPM | OXYGEN SATURATION: 93 % | RESPIRATION RATE: 22 BRPM | DIASTOLIC BLOOD PRESSURE: 57 MMHG | RESPIRATION RATE: 15 BRPM | RESPIRATION RATE: 21 BRPM | WEIGHT: 249 LBS

## 2025-02-07 ENCOUNTER — AMBULATORY SURGICAL CENTER (AMBULATORY)
Dept: URBAN - METROPOLITAN AREA SURGERY 17 | Facility: SURGERY | Age: 60
End: 2025-02-07
Payer: COMMERCIAL

## 2025-02-07 ENCOUNTER — OFFICE (AMBULATORY)
Dept: URBAN - METROPOLITAN AREA PATHOLOGY 4 | Facility: PATHOLOGY | Age: 60
End: 2025-02-07
Payer: COMMERCIAL

## 2025-02-07 DIAGNOSIS — Z12.11 ENCOUNTER FOR SCREENING FOR MALIGNANT NEOPLASM OF COLON: ICD-10-CM

## 2025-02-07 DIAGNOSIS — K62.1 RECTAL POLYP: ICD-10-CM

## 2025-02-07 DIAGNOSIS — D12.2 BENIGN NEOPLASM OF ASCENDING COLON: ICD-10-CM

## 2025-02-07 PROBLEM — K63.5 POLYP OF COLON: Status: ACTIVE | Noted: 2025-02-07

## 2025-02-07 PROCEDURE — 45385 COLONOSCOPY W/LESION REMOVAL: CPT | Mod: 33 | Performed by: INTERNAL MEDICINE

## 2025-02-07 PROCEDURE — 88305 TISSUE EXAM BY PATHOLOGIST: CPT | Performed by: PATHOLOGY
